# Patient Record
Sex: FEMALE | Race: WHITE | NOT HISPANIC OR LATINO | Employment: FULL TIME | URBAN - METROPOLITAN AREA
[De-identification: names, ages, dates, MRNs, and addresses within clinical notes are randomized per-mention and may not be internally consistent; named-entity substitution may affect disease eponyms.]

---

## 2020-07-26 ENCOUNTER — HOSPITAL ENCOUNTER (EMERGENCY)
Facility: MEDICAL CENTER | Age: 25
End: 2020-07-26
Attending: EMERGENCY MEDICINE
Payer: COMMERCIAL

## 2020-07-26 VITALS
OXYGEN SATURATION: 99 % | TEMPERATURE: 98 F | WEIGHT: 187.83 LBS | HEART RATE: 66 BPM | BODY MASS INDEX: 36.88 KG/M2 | SYSTOLIC BLOOD PRESSURE: 111 MMHG | RESPIRATION RATE: 18 BRPM | HEIGHT: 60 IN | DIASTOLIC BLOOD PRESSURE: 63 MMHG
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
## 2020-07-26 DIAGNOSIS — S16.1XXA STRAIN OF NECK MUSCLE, INITIAL ENCOUNTER: ICD-10-CM

## 2020-07-26 PROCEDURE — 99284 EMERGENCY DEPT VISIT MOD MDM: CPT

## 2020-07-26 PROCEDURE — 700102 HCHG RX REV CODE 250 W/ 637 OVERRIDE(OP): Performed by: EMERGENCY MEDICINE

## 2020-07-26 PROCEDURE — A9270 NON-COVERED ITEM OR SERVICE: HCPCS | Performed by: EMERGENCY MEDICINE

## 2020-07-26 RX ORDER — IBUPROFEN 200 MG
400 TABLET ORAL EVERY 6 HOURS PRN
Status: SHIPPED | COMMUNITY
End: 2022-09-16

## 2020-07-26 RX ORDER — IBUPROFEN 600 MG/1
600 TABLET ORAL ONCE
Status: COMPLETED | OUTPATIENT
Start: 2020-07-26 | End: 2020-07-26

## 2020-07-26 RX ORDER — ACETAMINOPHEN 500 MG
1000 TABLET ORAL EVERY 6 HOURS PRN
Status: SHIPPED | COMMUNITY
End: 2022-09-16

## 2020-07-26 RX ADMIN — IBUPROFEN 600 MG: 600 TABLET ORAL at 09:25

## 2020-07-26 ASSESSMENT — LIFESTYLE VARIABLES
HAVE PEOPLE ANNOYED YOU BY CRITICIZING YOUR DRINKING: NO
HAVE YOU EVER FELT YOU SHOULD CUT DOWN ON YOUR DRINKING: NO
EVER FELT BAD OR GUILTY ABOUT YOUR DRINKING: NO
ON A TYPICAL DAY WHEN YOU DRINK ALCOHOL HOW MANY DRINKS DO YOU HAVE: 0
DO YOU DRINK ALCOHOL: NO
HOW MANY TIMES IN THE PAST YEAR HAVE YOU HAD 5 OR MORE DRINKS IN A DAY: 0
EVER HAD A DRINK FIRST THING IN THE MORNING TO STEADY YOUR NERVES TO GET RID OF A HANGOVER: NO
CONSUMPTION TOTAL: NEGATIVE
TOTAL SCORE: 0
AVERAGE NUMBER OF DAYS PER WEEK YOU HAVE A DRINK CONTAINING ALCOHOL: 0

## 2020-07-26 NOTE — ED NOTES
Pt will be discharged home with instructions to follow up with primary care provider, or return to ED if symptoms worsen, or for any emergent medical process.  She verbalizes understanding of educational materials provided during this visit and agrees to follow our recommendations.  No exacerbations of initial presentations are noted.  Ambulates independently and denies any new needs.  The patient will return for new onset or worsening symptomatology and is stable at the time of discharge. She was given return precautions and verbalizes understanding and will comply with recommendations.
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
## 2020-07-26 NOTE — Clinical Note
Randi Garcia was seen and treated in our emergency department on 7/26/2020.  She may return to work on 07/28/2020.       If you have any questions or concerns, please don't hesitate to call.      Rinku Abreu M.D.

## 2020-09-29 ENCOUNTER — HOSPITAL ENCOUNTER (OUTPATIENT)
Facility: MEDICAL CENTER | Age: 25
End: 2020-09-29
Attending: OBSTETRICS & GYNECOLOGY
Payer: COMMERCIAL

## 2020-09-29 ENCOUNTER — GYNECOLOGY VISIT (OUTPATIENT)
Dept: OBGYN | Facility: CLINIC | Age: 25
End: 2020-09-29
Payer: COMMERCIAL

## 2020-09-29 VITALS — SYSTOLIC BLOOD PRESSURE: 104 MMHG | WEIGHT: 187 LBS | DIASTOLIC BLOOD PRESSURE: 62 MMHG | BODY MASS INDEX: 36.52 KG/M2

## 2020-09-29 DIAGNOSIS — Z01.419 WELL WOMAN EXAM WITH ROUTINE GYNECOLOGICAL EXAM: ICD-10-CM

## 2020-09-29 DIAGNOSIS — Z12.4 CERVICAL CANCER SCREENING: ICD-10-CM

## 2020-09-29 LAB — CYTOLOGY REG CYTOL: NORMAL

## 2020-09-29 PROCEDURE — 88175 CYTOPATH C/V AUTO FLUID REDO: CPT

## 2020-09-29 PROCEDURE — 99385 PREV VISIT NEW AGE 18-39: CPT | Performed by: OBSTETRICS & GYNECOLOGY

## 2020-09-29 NOTE — PROGRESS NOTES
Well Woman Exam    CC: well woman exam        HPI: Randi Garcia is a 25 y.o.  female who presents for her Annual Gynecologic Exam  Pt has no complaints.  Currently using Mirena IUD for contraception, sexually active with 1 male partner.  Reports her Mirena is due for exchange and baby of next year but has noticed some increase bleeding with her periods recently.  Considering kids after her next Mirena closer to age 29.      Health Maintenance:  Pap: reports 3yrs  Gardisil: completed      ROS:   12 system review of symptoms performed and negative, see intake form for details      OB History    Para Term  AB Living   0 0 0 0 0 0   SAB TAB Ectopic Molar Multiple Live Births   0 0 0 0 0 0         GYN Hx:   Menarche 12  Minimal menses w/ mirena  Denies hx of STIs  Denies hx of abnl paps     Past Medical History:   Diagnosis Date   • Anxiety    • Depression        Past Surgical History:   Procedure Laterality Date   • APPENDECTOMY     • OTHER      ankle surgeries       Medications:   Current Outpatient Medications Ordered in Epic   Medication Sig Dispense Refill   • levonorgestrel (MIRENA) 52 mg (20 mcg/24 hr) IUD 1 Intra Uterine Device by Intrauterine route See Admin Instructions. Change every 4 years     • acetaminophen (TYLENOL) 500 MG Tab Take 1,000 mg by mouth every 6 hours as needed for Moderate Pain.     • ibuprofen (MOTRIN) 200 MG Tab Take 400 mg by mouth every 6 hours as needed for Mild Pain.       No current Epic-ordered facility-administered medications on file.        Allergies: Hydrocodone and Penicillins    Social History     Tobacco Use   • Smoking status: Never Smoker   • Smokeless tobacco: Never Used   Substance Use Topics   • Alcohol use: Not Currently   • Drug use: Never         Family History   Problem Relation Age of Onset   • Cancer Mother    • No Known Problems Father    • Asthma Sister    • No Known Problems Brother      Blood clots in mother??  Patient reports her  mom during pregnancy may have had a blood clot in her leg, reports that she does not speak with her mother currently so cannot asked to find out if her mother was on blood thinners related to this or if she had inherited thrombophilia testing      Physical Exam   /62   Wt 84.8 kg (187 lb)   LMP 2020   Breastfeeding No   BMI 36.52 kg/m²   gen: AAO, NAD, affect appropriate  Neck: non-tender, no masses, no thyromegaly/nodules appreciated  CV: RRR; no LE edema  resp: ctab  Breast: symmetric, no skin changes, no masses, nontender, no nipple discharge, no lymphadenopathy  abd: soft, NT, ND, no masses, no organomegaly, no hernias  : NEFG, normal urethral meatus, normal anus/perineum, normal vagina and cervix, +strings.  Uterus small, anteverted, no adnexal masses/tenderness  Skin: warm/dry, no lesions    Breast and pelvic exam chaperoned by MA (KATIE).  Assessment:   25 y.o.  here for well woman exam  1. Cervical cancer screening  THINPREP PAP,REFLEX HPV ON ASC-US ONLY   2. Well woman exam with routine gynecological exam  IUD Insertion       Plan:   Pap:collected, reviewed pap guidelines  Mirena exchange whenever patient is ready and desiring of this    Declines STI screening today    Discussed possible family history of VTE during pregnancy.  Discussed that ideally we would have a more accurate family history and know if her mother had been tested for inherited thrombophilias.  Could consider testing for common inherited thrombophilias for patient at any time, especially prior to pregnancy.      Follow up for Mirena IUD insertion      Soniya May MD  [facility], Women's [facility]

## 2020-09-29 NOTE — NON-PROVIDER
Pt here for new pt appt  Pt states she wants to talk about BC/ Annual   Pharmacy verified  Good #: [phone]  PAP: 3 yrs ago   BC: Mirena Expires in Feb 2021, desires another mirena once this one expires.  Pap Today.

## 2020-10-12 ENCOUNTER — TELEPHONE (OUTPATIENT)
Dept: OBGYN | Facility: CLINIC | Age: 25
End: 2020-10-12

## 2020-10-12 NOTE — TELEPHONE ENCOUNTER
Patient LVM on 10/09 in regards to rescheduling her Mirena Insertion on 10/15 w/ Abdirahman.Returned patient's voicemail asking her to give us a call so we can r/s. Abdirahman's next available provider use only slot is 11/02.

## 2020-10-13 ENCOUNTER — TELEPHONE (OUTPATIENT)
Dept: OBGYN | Facility: CLINIC | Age: 25
End: 2020-10-13

## 2020-12-11 ENCOUNTER — GYNECOLOGY VISIT (OUTPATIENT)
Dept: OBGYN | Facility: CLINIC | Age: 25
End: 2020-12-11
Payer: COMMERCIAL

## 2020-12-11 VITALS — WEIGHT: 187 LBS | BODY MASS INDEX: 36.52 KG/M2 | SYSTOLIC BLOOD PRESSURE: 116 MMHG | DIASTOLIC BLOOD PRESSURE: 59 MMHG

## 2020-12-11 DIAGNOSIS — Z30.433 ENCOUNTER FOR REMOVAL AND REINSERTION OF IUD: ICD-10-CM

## 2020-12-11 LAB
INT CON NEG: NORMAL
INT CON POS: NORMAL
POC URINE PREGNANCY TEST: NEGATIVE

## 2020-12-11 PROCEDURE — 58300 INSERT INTRAUTERINE DEVICE: CPT | Performed by: OBSTETRICS & GYNECOLOGY

## 2020-12-11 PROCEDURE — 58301 REMOVE INTRAUTERINE DEVICE: CPT | Performed by: OBSTETRICS & GYNECOLOGY

## 2020-12-11 PROCEDURE — 81025 URINE PREGNANCY TEST: CPT | Performed by: OBSTETRICS & GYNECOLOGY

## 2020-12-11 NOTE — PROCEDURES
IUD Insertion    Date/Time: 2020 9:55 AM  Performed by: Soniya May M.D.  Authorized by: Soniya May M.D.     IUD Removal    Date/Time: 2020 9:55 AM  Performed by: Soniya May M.D.  Authorized by: Soniya May M.D.         CC: desires IUD exhange    HPI:  25 y.o.  presents today for desired IUD insertion.  Pt today desires Mirena IUD exchange.    No LMP recorded. Patient has had an implant.        /59   Wt 84.8 kg (187 lb)   BMI 36.52 kg/m²    The bimanual exam is performed the uterus is noted to be 6 weeks in size and is anteverted  IUD removal:  Speculum placed in the vagina and cervix visualized. IUD strings seen. IUD strings grasped with ring forceps, removed easily, noted to be intact, and discarded.  IUD Insertion:  the cervix was cleansed with Betadine swabs x3  Tenaculum was placed on the anterior lip of the cervix  The IUD was loaded into   The uterus was sounded to a depth of 6cm  The IUD was released into the uterus.    The strings trimmed to approximately 2 cm  Tenaculum was removed from the cervix and hemostasis was noted.  The patient tolerated the procedure well    Lot: [id number]      A/P: 25 y.o.  s/p mirena IUD exchange as above  - prior to insertion, risks of IUD reviewed with pt including risk of insertion including pain, bleeding, infection, uterine perforation (approximately 1.1000) and possible need for additional surgical procedure for removal, as well as risks of IUD including pregnancy/contraception failure.    All questions answered, consent signed.    - reviewed need for backup contraception for intercourse within 7 days    Plan for f/u as needed for IUD check in 1mos, earlier if concerns.      Soniya May MD  [facility], Women[facility]

## 2020-12-11 NOTE — NON-PROVIDER
Pt here for IUD Mirena  Pt states no complaints  Good#[phone]  Pharmacy verified  UPT-Negative

## 2020-12-11 NOTE — PROGRESS NOTES
GYN:  Patient here for planned IUD removal and insertion.  See procedure note for detail.    Soniya May MD  [facility], Women's Health

## 2021-02-23 ENCOUNTER — NON-PROVIDER VISIT (OUTPATIENT)
Dept: URGENT CARE | Facility: PHYSICIAN GROUP | Age: 26
End: 2021-02-23

## 2021-02-23 DIAGNOSIS — Z02.1 PRE-EMPLOYMENT DRUG SCREENING: ICD-10-CM

## 2021-02-23 LAB
AMP AMPHETAMINE: NORMAL
COC COCAINE: NORMAL
INT CON NEG: NORMAL
INT CON POS: NORMAL
MET METHAMPHETAMINES: NORMAL
OPI OPIATES: NORMAL
PCP PHENCYCLIDINE: NORMAL
POC DRUG COMMENT 753798-OCCUPATIONAL HEALTH: NORMAL
THC: NORMAL

## 2021-02-23 PROCEDURE — 80305 DRUG TEST PRSMV DIR OPT OBS: CPT | Performed by: PHYSICIAN ASSISTANT

## 2021-03-10 ENCOUNTER — GYNECOLOGY VISIT (OUTPATIENT)
Dept: OBGYN | Facility: CLINIC | Age: 26
End: 2021-03-10
Payer: COMMERCIAL

## 2021-03-10 VITALS — BODY MASS INDEX: 36.52 KG/M2 | WEIGHT: 187 LBS | DIASTOLIC BLOOD PRESSURE: 73 MMHG | SYSTOLIC BLOOD PRESSURE: 126 MMHG

## 2021-03-10 DIAGNOSIS — Z30.431 ENCOUNTER FOR ROUTINE CHECKING OF INTRAUTERINE CONTRACEPTIVE DEVICE (IUD): ICD-10-CM

## 2021-03-10 PROCEDURE — 99212 OFFICE O/P EST SF 10 MIN: CPT | Performed by: OBSTETRICS & GYNECOLOGY

## 2021-03-10 NOTE — PROGRESS NOTES
GYN Visit    CC: IUD check    HPI:  25 y.o.  s/p mirena IUD exchange on 20 with me for contraception.  Reports feeling well, denies vaginal bleeding.  No concerns today.  Has had intercourse without difficulty.      ROS:  GYN: denies ongoing vaginal bleeding, pelvic pain, urinary concerns.      Physical exam:  /73   Wt 84.8 kg (187 lb)   Gen: conversant, NAD  Abd: soft, NT, ND, no masses  : NEFG, normal vagina, urthethral meatus normal in appearance, cervix normal with +strings present    A/P: 25 y.o.  with mirena IUD in place  - IUD appears in place        RTC prn or annually for well woman exam    Soniya May MD  [facility], Women's Health

## 2021-03-10 NOTE — NON-PROVIDER
Pt here for IUD Check   Mirena inserted 12/11/2020  Pt states no concerns   Pharmacy verified  Good # [phone]

## 2021-03-24 ENCOUNTER — OFFICE VISIT (OUTPATIENT)
Dept: URGENT CARE | Facility: PHYSICIAN GROUP | Age: 26
End: 2021-03-24
Payer: COMMERCIAL

## 2021-03-24 VITALS
HEART RATE: 89 BPM | BODY MASS INDEX: 35.34 KG/M2 | WEIGHT: 180 LBS | OXYGEN SATURATION: 96 % | DIASTOLIC BLOOD PRESSURE: 82 MMHG | HEIGHT: 60 IN | SYSTOLIC BLOOD PRESSURE: 120 MMHG | TEMPERATURE: 97 F | RESPIRATION RATE: 18 BRPM

## 2021-03-24 DIAGNOSIS — R07.89 CHEST DISCOMFORT: ICD-10-CM

## 2021-03-24 DIAGNOSIS — F32.A DEPRESSION, UNSPECIFIED DEPRESSION TYPE: ICD-10-CM

## 2021-03-24 DIAGNOSIS — F41.9 ANXIETY: ICD-10-CM

## 2021-03-24 PROCEDURE — 99204 OFFICE O/P NEW MOD 45 MIN: CPT | Performed by: NURSE PRACTITIONER

## 2021-03-24 NOTE — PROGRESS NOTES
Subjective:      Randi Garcia is a 25 y.o. female who presents with Shortness of Breath (x2days, chest pain, felt faint x1day)            HPI New. Randi is a 25 year old female here for shortness of breath, chest pain and feeling faint for 2 days. She denies fever, chills, myalgia, nausea or diarrhea. She denies history of asthma. Chest pain is in left chest, sharp. She does report anxiety and becomes tearful during exam. States problems sleeping. Appetite ok and no S/H ideation at this time. She is interested in counseling as well as possible medication therapy. Has supportive fiance here in [place] and rest of family in [place]. Estranged from mother but close to father and grandparents.    Hydrocodone and Penicillins  Current Outpatient Medications on File Prior to Visit   Medication Sig Dispense Refill   • levonorgestrel (MIRENA) 52 mg (20 mcg/24 hr) IUD 1 Intra Uterine Device by Intrauterine route See Admin Instructions. Change every 4 years     • acetaminophen (TYLENOL) 500 MG Tab Take 1,000 mg by mouth every 6 hours as needed for Moderate Pain.     • ibuprofen (MOTRIN) 200 MG Tab Take 400 mg by mouth every 6 hours as needed for Mild Pain.       No current facility-administered medications on file prior to visit.     Social History     Socioeconomic History   • Marital status: Single     Spouse name: Not on file   • Number of children: Not on file   • Years of education: Not on file   • Highest education level: Not on file   Occupational History   • Not on file   Tobacco Use   • Smoking status: Never Smoker   • Smokeless tobacco: Never Used   Substance and Sexual Activity   • Alcohol use: Not Currently   • Drug use: Never   • Sexual activity: Yes     Partners: Male     Birth control/protection: I.U.D.   Other Topics Concern   • Not on file   Social History Narrative   • Not on file     Social Determinants of Health     Financial Resource Strain:    • Difficulty of Paying Living Expenses:    Food  Insecurity:    • Worried About Running Out of Food in the Last Year:    • Ran Out of Food in the Last Year:    Transportation Needs:    • Lack of Transportation (Medical):    • Lack of Transportation (Non-Medical):    Physical Activity:    • Days of Exercise per Week:    • Minutes of Exercise per Session:    Stress:    • Feeling of Stress :    Social Connections:    • Frequency of Communication with Friends and Family:    • Frequency of Social Gatherings with Friends and Family:    • Attends Rastafari Services:    • Active Member of Clubs or Organizations:    • Attends Club or Organization Meetings:    • Marital Status:    Intimate Partner Violence:    • Fear of Current or Ex-Partner:    • Emotionally Abused:    • Physically Abused:    • Sexually Abused:      Breast Cancer-related family history is not on file.      ROS       Objective:     /82   Pulse 89   Temp 36.1 °C (97 °F) (Temporal)   Resp 18   Ht 1.524 m (5')   Wt 81.6 kg (180 lb)   SpO2 96%   BMI 35.15 kg/m²      Physical Exam  Constitutional:       Appearance: Normal appearance.   Cardiovascular:      Rate and Rhythm: Normal rate and regular rhythm.      Heart sounds: No murmur.   Pulmonary:      Effort: Pulmonary effort is normal.      Breath sounds: Normal breath sounds.   Abdominal:      General: Abdomen is flat.      Palpations: Abdomen is soft.      Tenderness: There is no abdominal tenderness.   Musculoskeletal:         General: Normal range of motion.   Skin:     General: Skin is warm and dry.   Neurological:      General: No focal deficit present.      Mental Status: She is alert and oriented to person, place, and time.   Psychiatric:         Attention and Perception: Attention normal.         Mood and Affect: Mood is depressed.         Speech: Speech normal.         Behavior: Behavior normal.         Thought Content: Thought content does not include homicidal or suicidal ideation.         Cognition and Memory: Cognition normal.       Comments: Teary eyed in clinic.                 Assessment/Plan:        1. Anxiety  REFERRAL TO FOLLOW-UP WITH PRIMARY CARE    REFERRAL TO PSYCHOLOGY    sertraline (ZOLOFT) 50 MG Tab   2. Depression, unspecified depression type  TSH    CBC WITH DIFFERENTIAL    Comp Metabolic Panel    REFERRAL TO FOLLOW-UP WITH PRIMARY CARE    REFERRAL TO PSYCHOLOGY    sertraline (ZOLOFT) 50 MG Tab   3. Chest discomfort  EKG - Clinic Performed     EKG with NSR; rate of 66; no ectopy or ST abnormalities  Referrals to PCP and psychology.  Start zoloft at 50 mg qhs. Discussed not stopping this medication abruptly and potential side effects.  Labs.  Urged to reach out with any questions or concerns.

## 2021-05-12 ENCOUNTER — TELEPHONE (OUTPATIENT)
Dept: SCHEDULING | Facility: IMAGING CENTER | Age: 26
End: 2021-05-12

## 2021-05-26 ENCOUNTER — OFFICE VISIT (OUTPATIENT)
Dept: MEDICAL GROUP | Facility: PHYSICIAN GROUP | Age: 26
End: 2021-05-26
Payer: COMMERCIAL

## 2021-05-26 VITALS
RESPIRATION RATE: 16 BRPM | WEIGHT: 193.6 LBS | OXYGEN SATURATION: 92 % | DIASTOLIC BLOOD PRESSURE: 74 MMHG | HEIGHT: 60 IN | SYSTOLIC BLOOD PRESSURE: 128 MMHG | HEART RATE: 79 BPM | BODY MASS INDEX: 38.01 KG/M2 | TEMPERATURE: 98.1 F

## 2021-05-26 DIAGNOSIS — R63.5 WEIGHT GAIN: ICD-10-CM

## 2021-05-26 DIAGNOSIS — F32.A DEPRESSION, UNSPECIFIED DEPRESSION TYPE: ICD-10-CM

## 2021-05-26 DIAGNOSIS — F41.9 ANXIETY: ICD-10-CM

## 2021-05-26 PROBLEM — M25.572 LEFT ANKLE PAIN: Status: ACTIVE | Noted: 2018-04-18

## 2021-05-26 PROBLEM — E66.9 OBESITY WITH BODY MASS INDEX 30 OR GREATER: Status: ACTIVE | Noted: 2018-04-18

## 2021-05-26 PROCEDURE — 99214 OFFICE O/P EST MOD 30 MIN: CPT | Performed by: FAMILY MEDICINE

## 2021-05-26 ASSESSMENT — PATIENT HEALTH QUESTIONNAIRE - PHQ9: CLINICAL INTERPRETATION OF PHQ2 SCORE: 0

## 2021-05-26 NOTE — PROGRESS NOTES
Subjective:     CC:   Chief Complaint   Patient presents with   • Establish Care       HPI:   Randi presents today to establish care. Patient was placed on sertraline about 3 months ago and states she is feeling wonderful. She feels that this certainly impact she hasn't felt this good since she was 12 years of age. Patient was also supposed to get lab work done and patient is willing to go ahead and get this done now. Patient when she was going to you did have MMR titers done when she was in the meeting but they did give her 1 vaccine recommend repeating the titers again. Patient states that she did have chickenpox as a child. Patient is seeing GYN and just had a Mirena placed in January. Patient did get her last Covid shot this month    Past Medical History:   Diagnosis Date   • Anxiety    • Depression        Social History     Tobacco Use   • Smoking status: Never Smoker   • Smokeless tobacco: Never Used   Vaping Use   • Vaping Use: Never used   Substance Use Topics   • Alcohol use: Yes     Comment: Occ   • Drug use: Never       Current Outpatient Medications Ordered in Epic   Medication Sig Dispense Refill   • sertraline (ZOLOFT) 50 MG Tab To take 1 p.o. each day 90 tablet 1   • levonorgestrel (MIRENA) 52 mg (20 mcg/24 hr) IUD 1 Intra Uterine Device by Intrauterine route See Admin Instructions. Change every 4 years     • acetaminophen (TYLENOL) 500 MG Tab Take 1,000 mg by mouth every 6 hours as needed for Moderate Pain.     • ibuprofen (MOTRIN) 200 MG Tab Take 400 mg by mouth every 6 hours as needed for Mild Pain.       No current Epic-ordered facility-administered medications on file.       Allergies:  Hydrocodone and Penicillins    Health Maintenance: Completed    ROS:  Gen: no fevers/chills, no changes in weight  Eyes: no changes in vision  ENT: no sore throat, no hearing loss, no bloody nose  Pulm: no sob, no cough, no snoring or stop breathing at night  CV: no chest pain, no palpitations  GI: no  nausea/vomiting, no diarrhea, no black or bloody stools  : no dysuria  MSk: no myalgias  Skin: no rash  Neuro: no headaches, no numbness/tingling  Heme/Lymph: no easy bruising    Objective:     Exam:  /74   Pulse 79   Temp 36.7 °C (98.1 °F)   Resp 16   Ht 1.524 m (5')   Wt 87.8 kg (193 lb 9.6 oz)   SpO2 92%   BMI 37.81 kg/m²  Body mass index is 37.81 kg/m².    Gen: Alert and oriented, No apparent distress.  Skin: Warm and dry.  No obvious lesions.  Eyes: Sclera wnl Pupils normal in size  Lungs: Normal effort, CTA bilaterally, no wheezes, rhonchi, or rales  CV: Regular rate and rhythm. No murmurs, rubs, or gallops.  ABD: Soft non-tender no organomegaly  Musculoskeletal: Normal gait. No extremity cyanosis, clubbing, or edema.  Neuro: Oriented to person, place and time  Psych: Mood is wnl       Labs: Fasting labs were ordered patient given a listing of autoimmune labs    Assessment & Plan:     26 y.o. female with the following -     1. Weight gain  We'll need to get lab work done on patient this is a chronic problem. Since patient was feeling well she is fine and she is not overeating we'll need to have her follow-up in about 2 months see how she is doing on her weight    2. Anxiety  Patient is doing well on the present medication we'll continue this. This is a chronic problem  - sertraline (ZOLOFT) 50 MG Tab; To take 1 p.o. each day  Dispense: 90 tablet; Refill: 1    3. Depression, unspecified depression type  Patient is feeling great we'll continue present medications this is a chronic  - sertraline (ZOLOFT) 50 MG Tab; To take 1 p.o. each day  Dispense: 90 tablet; Refill: 1  4. Wellness  We'll get wellness labs on patient.    No follow-ups on file.    Please note that this dictation was created using voice recognition software. I have made every reasonable attempt to correct obvious errors, but I expect that there are errors of grammar and possibly content that I did not discover before finalizing the  note.

## 2021-11-17 ENCOUNTER — HOSPITAL ENCOUNTER (OUTPATIENT)
Dept: LAB | Facility: MEDICAL CENTER | Age: 26
End: 2021-11-17
Attending: FAMILY MEDICINE
Payer: COMMERCIAL

## 2021-11-17 ENCOUNTER — OFFICE VISIT (OUTPATIENT)
Dept: MEDICAL GROUP | Facility: PHYSICIAN GROUP | Age: 26
End: 2021-11-17
Payer: COMMERCIAL

## 2021-11-17 VITALS
BODY MASS INDEX: 39.7 KG/M2 | SYSTOLIC BLOOD PRESSURE: 124 MMHG | RESPIRATION RATE: 16 BRPM | TEMPERATURE: 98.1 F | HEIGHT: 60 IN | WEIGHT: 202.2 LBS | OXYGEN SATURATION: 95 % | DIASTOLIC BLOOD PRESSURE: 76 MMHG | HEART RATE: 76 BPM

## 2021-11-17 DIAGNOSIS — F32.A DEPRESSION, UNSPECIFIED DEPRESSION TYPE: ICD-10-CM

## 2021-11-17 DIAGNOSIS — R63.5 WEIGHT GAIN: ICD-10-CM

## 2021-11-17 DIAGNOSIS — R11.2 NAUSEA AND VOMITING, INTRACTABILITY OF VOMITING NOT SPECIFIED, UNSPECIFIED VOMITING TYPE: ICD-10-CM

## 2021-11-17 DIAGNOSIS — F41.9 ANXIETY: ICD-10-CM

## 2021-11-17 LAB
ALBUMIN SERPL BCP-MCNC: 4.8 G/DL (ref 3.2–4.9)
ALBUMIN/GLOB SERPL: 1.8 G/DL
ALP SERPL-CCNC: 71 U/L (ref 30–99)
ALT SERPL-CCNC: 27 U/L (ref 2–50)
ANION GAP SERPL CALC-SCNC: 14 MMOL/L (ref 7–16)
AST SERPL-CCNC: 15 U/L (ref 12–45)
BASOPHILS # BLD AUTO: 0.6 % (ref 0–1.8)
BASOPHILS # BLD: 0.05 K/UL (ref 0–0.12)
BILIRUB SERPL-MCNC: 0.6 MG/DL (ref 0.1–1.5)
BUN SERPL-MCNC: 13 MG/DL (ref 8–22)
CALCIUM SERPL-MCNC: 9.3 MG/DL (ref 8.5–10.5)
CHLORIDE SERPL-SCNC: 103 MMOL/L (ref 96–112)
CHOLEST SERPL-MCNC: 164 MG/DL (ref 100–199)
CO2 SERPL-SCNC: 21 MMOL/L (ref 20–33)
CREAT SERPL-MCNC: 0.77 MG/DL (ref 0.5–1.4)
EOSINOPHIL # BLD AUTO: 0.06 K/UL (ref 0–0.51)
EOSINOPHIL NFR BLD: 0.7 % (ref 0–6.9)
ERYTHROCYTE [DISTWIDTH] IN BLOOD BY AUTOMATED COUNT: 43.3 FL (ref 35.9–50)
FASTING STATUS PATIENT QL REPORTED: NORMAL
GLOBULIN SER CALC-MCNC: 2.7 G/DL (ref 1.9–3.5)
GLUCOSE SERPL-MCNC: 76 MG/DL (ref 65–99)
HCT VFR BLD AUTO: 47 % (ref 37–47)
HDLC SERPL-MCNC: 49 MG/DL
HGB BLD-MCNC: 15.7 G/DL (ref 12–16)
IMM GRANULOCYTES # BLD AUTO: 0.02 K/UL (ref 0–0.11)
IMM GRANULOCYTES NFR BLD AUTO: 0.2 % (ref 0–0.9)
LDLC SERPL CALC-MCNC: 102 MG/DL
LYMPHOCYTES # BLD AUTO: 3.38 K/UL (ref 1–4.8)
LYMPHOCYTES NFR BLD: 37.6 % (ref 22–41)
MCH RBC QN AUTO: 31.6 PG (ref 27–33)
MCHC RBC AUTO-ENTMCNC: 33.4 G/DL (ref 33.6–35)
MCV RBC AUTO: 94.6 FL (ref 81.4–97.8)
MONOCYTES # BLD AUTO: 0.6 K/UL (ref 0–0.85)
MONOCYTES NFR BLD AUTO: 6.7 % (ref 0–13.4)
NEUTROPHILS # BLD AUTO: 4.88 K/UL (ref 2–7.15)
NEUTROPHILS NFR BLD: 54.2 % (ref 44–72)
NRBC # BLD AUTO: 0 K/UL
NRBC BLD-RTO: 0 /100 WBC
PLATELET # BLD AUTO: 285 K/UL (ref 164–446)
PMV BLD AUTO: 11.4 FL (ref 9–12.9)
POTASSIUM SERPL-SCNC: 4 MMOL/L (ref 3.6–5.5)
PROT SERPL-MCNC: 7.5 G/DL (ref 6–8.2)
RBC # BLD AUTO: 4.97 M/UL (ref 4.2–5.4)
RUBV AB SER QL: 157 IU/ML
SODIUM SERPL-SCNC: 138 MMOL/L (ref 135–145)
T4 FREE SERPL-MCNC: 1.61 NG/DL (ref 0.93–1.7)
TRIGL SERPL-MCNC: 63 MG/DL (ref 0–149)
TSH SERPL DL<=0.005 MIU/L-ACNC: 0.53 UIU/ML (ref 0.38–5.33)
WBC # BLD AUTO: 9 K/UL (ref 4.8–10.8)

## 2021-11-17 PROCEDURE — 80053 COMPREHEN METABOLIC PANEL: CPT

## 2021-11-17 PROCEDURE — 84439 ASSAY OF FREE THYROXINE: CPT

## 2021-11-17 PROCEDURE — 84443 ASSAY THYROID STIM HORMONE: CPT

## 2021-11-17 PROCEDURE — 86735 MUMPS ANTIBODY: CPT

## 2021-11-17 PROCEDURE — 99213 OFFICE O/P EST LOW 20 MIN: CPT | Performed by: FAMILY MEDICINE

## 2021-11-17 PROCEDURE — 36415 COLL VENOUS BLD VENIPUNCTURE: CPT

## 2021-11-17 PROCEDURE — 80061 LIPID PANEL: CPT

## 2021-11-17 PROCEDURE — 85025 COMPLETE CBC W/AUTO DIFF WBC: CPT

## 2021-11-17 PROCEDURE — 86762 RUBELLA ANTIBODY: CPT

## 2021-11-17 PROCEDURE — 86765 RUBEOLA ANTIBODY: CPT

## 2021-11-17 RX ORDER — OMEPRAZOLE 40 MG/1
40 CAPSULE, DELAYED RELEASE ORAL DAILY
Qty: 30 CAPSULE | Refills: 1 | Status: SHIPPED
Start: 2021-11-17 | End: 2021-11-24

## 2021-11-17 NOTE — PROGRESS NOTES
Subjective:     CC:   Chief Complaint   Patient presents with   • Follow-Up       HPI:   Randi presents today with a month-long history of occasionally throwing up.  Patient usually states that she has mucus or yellowish material.  Patient denies any problems with her bowel habits.  Patient states that she is able to eat.  This vomiting occurs in various times a day.  Patient states when she was a lot younger she had problems with reflux.  Patient does have IUD in but we went ahead and did a pregnancy test which is negative.    Past Medical History:   Diagnosis Date   • Anxiety    • Depression    • Nausea & vomiting 11/17/2021       Social History     Tobacco Use   • Smoking status: Never Smoker   • Smokeless tobacco: Never Used   Vaping Use   • Vaping Use: Never used   Substance Use Topics   • Alcohol use: Yes     Comment: Occ   • Drug use: Never       Current Outpatient Medications Ordered in Epic   Medication Sig Dispense Refill   • omeprazole (PRILOSEC) 40 MG delayed-release capsule Take 1 Capsule by mouth every day for 30 days. 30 Capsule 1   • sertraline (ZOLOFT) 50 MG Tab To take 1 p.o. each day 90 tablet 1   • levonorgestrel (MIRENA) 52 mg (20 mcg/24 hr) IUD 1 Intra Uterine Device by Intrauterine route See Admin Instructions. Change every 4 years     • acetaminophen (TYLENOL) 500 MG Tab Take 1,000 mg by mouth every 6 hours as needed for Moderate Pain.     • ibuprofen (MOTRIN) 200 MG Tab Take 400 mg by mouth every 6 hours as needed for Mild Pain.       No current Epic-ordered facility-administered medications on file.       Allergies:  Hydrocodone and Penicillins    Health Maintenance: Completed    ROS:  Gen: no fevers/chills, patient has gained some weight  Eyes: no changes in vision  ENT: no sore throat, no hearing loss, no bloody nose  Pulm: no sob, no cough  CV: no chest pain, no palpitations  GI: no nausea/vomiting, no diarrhea  Skin: no rash  Neuro: no headaches, no numbness/tingling  Heme/Lymph: no  easy bruising    Objective:     Exam:  /76   Pulse 76   Temp 36.7 °C (98.1 °F)   Resp 16   Ht 1.524 m (5')   Wt 91.7 kg (202 lb 3.2 oz)   SpO2 95%   BMI 39.49 kg/m²  Body mass index is 39.49 kg/m².    Gen: Alert and oriented, No apparent distress.  Skin: Warm and dry.  No obvious lesions.  Eyes: Sclera wnl Pupils normal in size  Lungs: Normal effort, CTA bilaterally, no wheezes, rhonchi, or rales  CV: Regular rate and rhythm. No murmurs, rubs, or gallops.  ABD: Soft very minimal discomfort on  palpation of the epigastric area  Musculoskeletal: Normal gait. No extremity cyanosis, clubbing, or edema.  Neuro: Oriented to person, place and time  Psych: Mood is wnl       Assessment & Plan:     26 y.o. female with the following -     1. Nausea and vomiting, intractability of vomiting not specified, unspecified vomiting type  I did remind patient she needs her lab work done she will go ahead and get that done today.  I will also put her on Prilosec assuming this may be heartburn since she has had it long ago.  Patient has been on sertraline in the past and has been able to tolerate that so we will continue it for now since it is working for her.  This is a acute problem    Other orders  - omeprazole (PRILOSEC) 40 MG delayed-release capsule; Take 1 Capsule by mouth every day for 30 days.  Dispense: 30 Capsule; Refill: 1       Return in about 1 week (around 11/24/2021).    Please note that this dictation was created using voice recognition software. I have made every reasonable attempt to correct obvious errors, but I expect that there are errors of grammar and possibly content that I did not discover before finalizing the note.

## 2021-11-18 LAB
MEV IGG SER IA-ACNC: 0.5
MUV IGG SER IA-ACNC: 2.19

## 2021-11-24 ENCOUNTER — OFFICE VISIT (OUTPATIENT)
Dept: MEDICAL GROUP | Facility: PHYSICIAN GROUP | Age: 26
End: 2021-11-24
Payer: COMMERCIAL

## 2021-11-24 VITALS
OXYGEN SATURATION: 99 % | TEMPERATURE: 98.6 F | DIASTOLIC BLOOD PRESSURE: 80 MMHG | WEIGHT: 200.4 LBS | BODY MASS INDEX: 39.34 KG/M2 | HEIGHT: 60 IN | SYSTOLIC BLOOD PRESSURE: 122 MMHG | RESPIRATION RATE: 16 BRPM | HEART RATE: 68 BPM

## 2021-11-24 DIAGNOSIS — G89.29 CHRONIC PAIN OF BOTH ANKLES: ICD-10-CM

## 2021-11-24 DIAGNOSIS — M25.572 CHRONIC PAIN OF BOTH ANKLES: ICD-10-CM

## 2021-11-24 DIAGNOSIS — S90.219S: ICD-10-CM

## 2021-11-24 DIAGNOSIS — M25.571 CHRONIC PAIN OF BOTH ANKLES: ICD-10-CM

## 2021-11-24 DIAGNOSIS — Z23 NEED FOR VACCINATION: ICD-10-CM

## 2021-11-24 PROBLEM — R11.2 NAUSEA & VOMITING: Status: RESOLVED | Noted: 2021-11-17 | Resolved: 2021-11-24

## 2021-11-24 PROCEDURE — 90472 IMMUNIZATION ADMIN EACH ADD: CPT | Performed by: FAMILY MEDICINE

## 2021-11-24 PROCEDURE — 99214 OFFICE O/P EST MOD 30 MIN: CPT | Mod: 25 | Performed by: FAMILY MEDICINE

## 2021-11-24 PROCEDURE — 90686 IIV4 VACC NO PRSV 0.5 ML IM: CPT | Performed by: FAMILY MEDICINE

## 2021-11-24 PROCEDURE — 90707 MMR VACCINE SC: CPT | Performed by: FAMILY MEDICINE

## 2021-11-24 PROCEDURE — 90471 IMMUNIZATION ADMIN: CPT | Performed by: FAMILY MEDICINE

## 2021-11-24 ASSESSMENT — FIBROSIS 4 INDEX: FIB4 SCORE: 0.27

## 2021-11-25 NOTE — PROGRESS NOTES
Subjective:     CC:   Chief Complaint   Patient presents with   • Follow-Up       HPI:   Randi presents today for follow-up of her labs.  Patient also has been having bilateral ankle issues she did have surgery done over at [facility] and that was more than 2 years ago but now she is having more discomfort on the lateral sides of her ankle.  Patient also has had a issue with her right great toe the medial side of that toenail always falls off which she states causes a problem.  Patient was seen me last time because she was having some nausea patient states the nausea completely went away she never picked up the prescription for omeprazole.  Patient denies any issues with reflux or acid indigestion.  Patient will be getting a different insurance will probably need to wait on any referrals patient will notify me when her new insurance kicks in    Past Medical History:   Diagnosis Date   • Anxiety    • Depression    • Nausea & vomiting 11/17/2021       Social History     Tobacco Use   • Smoking status: Never Smoker   • Smokeless tobacco: Never Used   Vaping Use   • Vaping Use: Never used   Substance Use Topics   • Alcohol use: Yes     Comment: Occ   • Drug use: Never       Current Outpatient Medications Ordered in Epic   Medication Sig Dispense Refill   • sertraline (ZOLOFT) 50 MG Tab To take 1 p.o. each day 90 tablet 1   • levonorgestrel (MIRENA) 52 mg (20 mcg/24 hr) IUD 1 Intra Uterine Device by Intrauterine route See Admin Instructions. Change every 4 years     • acetaminophen (TYLENOL) 500 MG Tab Take 1,000 mg by mouth every 6 hours as needed for Moderate Pain.     • ibuprofen (MOTRIN) 200 MG Tab Take 400 mg by mouth every 6 hours as needed for Mild Pain.       No current Epic-ordered facility-administered medications on file.       Allergies:  Hydrocodone and Penicillins    Health Maintenance: Completed    ROS:  Gen: no fevers/chills, patient has lost 2 pounds since have last seen her  Eyes: no  changes in vision  ENT: no sore throat, no hearing loss, no bloody nose  Pulm: no sob, no cough  CV: no chest pain, no palpitations  GI: no nausea/vomiting, no diarrhea  : no dysuria  MSk: no myalgias  Skin: no rash  Neuro: no headaches, no numbness/tingling  Heme/Lymph: no easy bruising    Objective:     Exam:  /80   Pulse 68   Temp 37 °C (98.6 °F)   Resp 16   Ht 1.524 m (5')   Wt 90.9 kg (200 lb 6.4 oz)   SpO2 99%   BMI 39.14 kg/m²  Body mass index is 39.14 kg/m².    Gen: Alert and oriented, No apparent distress.  Skin: Warm and dry.  No obvious lesions.  On patient's right great toe she has some whiteness to the medial side of her nail plus part of the nail above it is gone  Eyes: Sclera wnl Pupils normal in size  Lungs: Normal effort, CTA bilaterally, no wheezes, rhonchi, or rales  CV: Regular rate and rhythm. No murmurs, rubs, or gallops.  ABD: Soft non-tender no organomegaly  Musculoskeletal: Normal gait. No extremity cyanosis, clubbing, or edema.  Neuro: Oriented to person, place and time  Psych: Mood is wnl       Assessment & Plan:     26 y.o. female with the following -     1. Chronic pain of both ankles  Patient to let me know when her new insurance gets kicks in  referral can be written to [facility].  I did let patient know she can always go to [facility] also this is a chronic problem  2. Contusion of unspecified great toe with damage to nail, sequela  Would recommend referral to podiatry patient will let me know when her new insurance kicks in so referral can be done this is a chronic problem  3. Need for vaccination  Patient's immunity to mumps and rubella is present but rubeola is equivocal.  I would recommend immunizing her patient needs a repeat MMR in 1 to 2 months  - INFLUENZA VACCINE QUAD INJ (PF)  - MMR SQ       Return in about 2 months (around 1/24/2022), or if symptoms worsen or fail to improve.    Please note that this dictation was created  using voice recognition software. I have made every reasonable attempt to correct obvious errors, but I expect that there are errors of grammar and possibly content that I did not discover before finalizing the note.

## 2021-12-13 DIAGNOSIS — F41.9 ANXIETY: ICD-10-CM

## 2021-12-13 DIAGNOSIS — F32.A DEPRESSION, UNSPECIFIED DEPRESSION TYPE: ICD-10-CM
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
## 2022-08-08 ENCOUNTER — OFFICE VISIT (OUTPATIENT)
Dept: URGENT CARE | Facility: CLINIC | Age: 27
End: 2022-08-08
Payer: COMMERCIAL

## 2022-08-08 VITALS
DIASTOLIC BLOOD PRESSURE: 70 MMHG | SYSTOLIC BLOOD PRESSURE: 108 MMHG | WEIGHT: 200 LBS | TEMPERATURE: 97.2 F | HEART RATE: 65 BPM | BODY MASS INDEX: 39.27 KG/M2 | OXYGEN SATURATION: 99 % | HEIGHT: 60 IN | RESPIRATION RATE: 20 BRPM

## 2022-08-08 DIAGNOSIS — Z20.822 EXPOSURE TO CONFIRMED CASE OF COVID-19: ICD-10-CM

## 2022-08-08 DIAGNOSIS — U07.1 COVID: ICD-10-CM

## 2022-08-08 DIAGNOSIS — R05.9 COUGH: ICD-10-CM

## 2022-08-08 LAB
EXTERNAL QUALITY CONTROL: ABNORMAL
SARS-COV+SARS-COV-2 AG RESP QL IA.RAPID: POSITIVE

## 2022-08-08 PROCEDURE — 87426 SARSCOV CORONAVIRUS AG IA: CPT | Performed by: NURSE PRACTITIONER

## 2022-08-08 PROCEDURE — 99213 OFFICE O/P EST LOW 20 MIN: CPT | Mod: CS,25 | Performed by: NURSE PRACTITIONER

## 2022-08-08 RX ORDER — DEXTROMETHORPHAN HYDROBROMIDE AND PROMETHAZINE HYDROCHLORIDE 15; 6.25 MG/5ML; MG/5ML
5 SYRUP ORAL EVERY 4 HOURS PRN
Qty: 120 ML | Refills: 0 | Status: SHIPPED
Start: 2022-08-08 | End: 2022-09-16

## 2022-08-08 RX ORDER — ALBUTEROL SULFATE 90 UG/1
2 AEROSOL, METERED RESPIRATORY (INHALATION) EVERY 6 HOURS PRN
Qty: 8.5 G | Refills: 0 | Status: SHIPPED
Start: 2022-08-08 | End: 2022-09-16

## 2022-08-08 ASSESSMENT — ENCOUNTER SYMPTOMS
HEADACHES: 1
RHINORRHEA: 1
MYALGIAS: 0
SORE THROAT: 0
NAUSEA: 0
COUGH: 1
VOMITING: 0
DIZZINESS: 0
SHORTNESS OF BREATH: 1
EYE REDNESS: 0
FEVER: 0
CHILLS: 1

## 2022-08-08 ASSESSMENT — FIBROSIS 4 INDEX: FIB4 SCORE: 0.28

## 2022-08-08 NOTE — PROGRESS NOTES
Subjective:   Randi Farmer is a 27 y.o. female who presents for Coronavirus Screening (Spouse covid positive), Shortness of Breath, and Cough      URI   This is a new problem. The current episode started in the past 7 days (hsuband positive covid , multiple negative at home tests). The problem has been gradually worsening. There has been no fever. Associated symptoms include congestion, coughing, headaches and rhinorrhea. Pertinent negatives include no chest pain, dysuria, ear pain, nausea, plugged ear sensation, rash, sore throat or vomiting. She has tried acetaminophen and sleep for the symptoms. The treatment provided no relief.       Review of Systems   Constitutional: Positive for chills and malaise/fatigue. Negative for fever.   HENT: Positive for congestion and rhinorrhea. Negative for ear pain and sore throat.    Eyes: Negative for redness.   Respiratory: Positive for cough and shortness of breath.    Cardiovascular: Negative for chest pain.   Gastrointestinal: Negative for nausea and vomiting.   Genitourinary: Negative for dysuria.   Musculoskeletal: Negative for myalgias.   Skin: Negative for rash.   Neurological: Positive for headaches. Negative for dizziness.       Medications:    • acetaminophen Tabs  • albuterol Aers  • ibuprofen Tabs  • levonorgestrel  • promethazine-dextromethorphan  • sertraline Tabs    Allergies: Hydrocodone and Penicillins    Problem List: Randi Farmer does not have any pertinent problems on file.    Surgical History:  Past Surgical History:   Procedure Laterality Date   • APPENDECTOMY     • OTHER      ankle surgeries       Past Social Hx: Randi Farmer  reports that she has never smoked. She has never used smokeless tobacco. She reports current alcohol use. She reports that she does not use drugs.     Past Family Hx:  Randi Farmer family history includes Alcohol abuse in her father; Asthma in her sister; Cancer in her mother; No  Known Problems in her brother.     Problem list, medications, and allergies reviewed by myself today in Epic.     Objective:     /70 (BP Location: Left arm, Patient Position: Sitting, BP Cuff Size: Adult)   Pulse 65   Temp 36.2 °C (97.2 °F) (Temporal)   Resp 20   Ht 1.524 m (5')   Wt 90.7 kg (200 lb)   SpO2 99%   BMI 39.06 kg/m²     Physical Exam  Vitals and nursing note reviewed.   Constitutional:       General: She is not in acute distress.     Appearance: She is well-developed.   HENT:      Head: Normocephalic and atraumatic.      Right Ear: Tympanic membrane and external ear normal.      Left Ear: Tympanic membrane and external ear normal.      Nose: Congestion present.      Right Sinus: No maxillary sinus tenderness or frontal sinus tenderness.      Left Sinus: No maxillary sinus tenderness or frontal sinus tenderness.      Mouth/Throat:      Mouth: Mucous membranes are moist.      Pharynx: Uvula midline. No posterior oropharyngeal erythema.      Tonsils: No tonsillar exudate or tonsillar abscesses.   Eyes:      General:         Right eye: No discharge.         Left eye: No discharge.      Conjunctiva/sclera: Conjunctivae normal.   Cardiovascular:      Rate and Rhythm: Normal rate.   Pulmonary:      Effort: Pulmonary effort is normal. No respiratory distress.      Breath sounds: Normal breath sounds.   Abdominal:      General: There is no distension.   Musculoskeletal:         General: Normal range of motion.   Skin:     General: Skin is warm and dry.   Neurological:      General: No focal deficit present.      Mental Status: She is alert and oriented to person, place, and time. Mental status is at baseline.      Gait: Gait (gait at baseline) normal.   Psychiatric:         Judgment: Judgment normal.         Assessment/Plan:     Diagnosis and associated orders:     1. Cough  promethazine-dextromethorphan (PROMETHAZINE-DM) 6.25-15 MG/5ML syrup   2. Exposure to confirmed case of COVID-19     3. COVID   albuterol 108 (90 Base) MCG/ACT Aero Soln inhalation aerosol      Comments/MDM:     • Positive covid    Plenty of oral hydration and rest   Over the counter cough suppressant as directed.  Tylenol or ibuprofen for pain and fever as directed.   Warm salt water gargles for sore throat, soft foods, cool liquids.   Saline nasal spray and Flonase  Infection control measures at home. Stay away from people, Hand washing, covering sneeze/cough, disinfect surfaces.   Remain home from work, school, and other populated environments. Work note provided with information of quarantine measures per CDC guidelines.   Overall, the patient is well-appearing. They are not hypoxic, afebrile, and a normal pulmonary exam.      •              Please note that this dictation was created using voice recognition software. I have made a reasonable attempt to correct obvious errors, but I expect that there are errors of grammar and possibly content that I did not discover before finalizing the note.    This note was electronically signed by Rupert VALERIO.

## 2022-08-08 NOTE — LETTER
August 8, 2022         Patient: Randi Farmer   YOB: 1995   Date of Visit: 8/8/2022           To Whom it May Concern:    Randi Farmer was seen in my clinic on 8/8/2022. She may return to work on 8/11/22.    If you have any questions or concerns, please don't hesitate to call.        Sincerely,           GUY Song.  Electronically Signed

## 2022-09-16 ENCOUNTER — OFFICE VISIT (OUTPATIENT)
Dept: MEDICAL GROUP | Facility: PHYSICIAN GROUP | Age: 27
End: 2022-09-16
Payer: COMMERCIAL

## 2022-09-16 VITALS
OXYGEN SATURATION: 94 % | TEMPERATURE: 98.7 F | RESPIRATION RATE: 16 BRPM | WEIGHT: 205.6 LBS | DIASTOLIC BLOOD PRESSURE: 64 MMHG | HEART RATE: 66 BPM | SYSTOLIC BLOOD PRESSURE: 102 MMHG | BODY MASS INDEX: 40.37 KG/M2 | HEIGHT: 60 IN

## 2022-09-16 DIAGNOSIS — S90.219S: ICD-10-CM

## 2022-09-16 DIAGNOSIS — F41.9 ANXIETY: ICD-10-CM

## 2022-09-16 DIAGNOSIS — L68.9 EXCESS BODY AND FACIAL HAIR: ICD-10-CM

## 2022-09-16 DIAGNOSIS — F32.A DEPRESSION, UNSPECIFIED DEPRESSION TYPE: ICD-10-CM

## 2022-09-16 PROBLEM — L60.9 NAIL ABNORMALITY: Status: ACTIVE | Noted: 2022-09-16

## 2022-09-16 PROBLEM — L60.9 NAIL ABNORMALITY: Status: RESOLVED | Noted: 2022-09-16 | Resolved: 2022-09-16

## 2022-09-16 PROCEDURE — 99214 OFFICE O/P EST MOD 30 MIN: CPT | Performed by: FAMILY MEDICINE

## 2022-09-16 ASSESSMENT — PATIENT HEALTH QUESTIONNAIRE - PHQ9: CLINICAL INTERPRETATION OF PHQ2 SCORE: 0

## 2022-09-16 ASSESSMENT — FIBROSIS 4 INDEX: FIB4 SCORE: 0.28

## 2022-09-16 NOTE — PROGRESS NOTES
Subjective:     CC:   Chief Complaint   Patient presents with    Follow-Up       HPI:   Randi presents today for follow-up.  Patient states that she feels that maybe the sertraline may need to be slightly increased at times she feels anxious.  Patient is also wondering she is getting a lot of black hairs in her chest she does not know if one of the medications is causing a little bit more hair growth.  Patient is supposed to see her gynecologist in January I would recommend she mention that to the since she is on Mirena right now.  We can always refer her to dermatology also.  Patient does have a deformed right great toenail and is ready to see podiatry we will go ahead and write a referral.    Past Medical History:   Diagnosis Date    Anxiety     Depression     Nausea & vomiting 11/17/2021       Social History     Tobacco Use    Smoking status: Never    Smokeless tobacco: Never   Vaping Use    Vaping Use: Never used   Substance Use Topics    Alcohol use: Yes     Comment: Occ    Drug use: Never       Current Outpatient Medications Ordered in Epic   Medication Sig Dispense Refill    sertraline (ZOLOFT) 50 MG Tab Take 1-1/2 tablet each day 145 Tablet 0    levonorgestrel (MIRENA) 52 mg (20 mcg/24 hr) IUD 1 Intra Uterine Device by Intrauterine route See Admin Instructions. Change every 4 years       No current Epic-ordered facility-administered medications on file.       Allergies:  Hydrocodone and Penicillins    Health Maintenance: Completed    ROS:  Gen: no fevers/chills, patient has gained 5 pounds in 10 months  Eyes: no changes in vision  ENT: no sore throat, no hearing loss, no bloody nose  Pulm: no sob, no cough  CV: no chest pain, no palpitations  GI: no nausea/vomiting, no diarrhea  Neuro: no headaches, no numbness/tingling  Heme/Lymph: no easy bruising    Objective:     Exam:  /64 (BP Location: Left arm, Patient Position: Sitting, BP Cuff Size: Adult)   Pulse 66   Temp 37.1 °C (98.7 °F) (Temporal)    Resp 16   Ht 1.524 m (5')   Wt 93.3 kg (205 lb 9.6 oz)   SpO2 94%   BMI 40.15 kg/m²  Body mass index is 40.15 kg/m².    Gen: Alert and oriented, No apparent distress.  Skin: Warm and dry.  Patient is noted to have a deformed right great toenail  Eyes: Sclera wnl Pupils normal in size  Lungs: Normal effort, CTA bilaterally, no wheezes, rhonchi, or rales  CV: Regular rate and rhythm. No murmurs, rubs, or gallops.  Musculoskeletal: Normal gait. No extremity cyanosis, clubbing, or edema.  Neuro: Oriented to person, place and time  Psych: Mood is wnl       Assessment & Plan:     27 y.o. female with the following -     1. Excess body and facial hair  Patient to discuss this issue with gynecology but also will write for Derm referral.  Inform patient may take a little while until she is seen during the dermatologist this is acute problem    2. Contusion of unspecified great toe with damage to nail, sequela  Will write a podiatry referral this is a chronic problem    3. Anxiety  I recommend we increase the sertraline to a tablet and a half patient to see me back in about 2 to 3 months for follow-up or sooner if issues worsen this is a chronic problem  - sertraline (ZOLOFT) 50 MG Tab; Take 1-1/2 tablet each day  Dispense: 145 Tablet; Refill: 0    4. Depression, unspecified depression type  - sertraline (ZOLOFT) 50 MG Tab; Take 1-1/2 tablet each day  Dispense: 145 Tablet; Refill: 0       Return in about 3 months (around 12/16/2022).  Question whether she has had hepatitis B patient states when she went to [facility] ([place] they did get some shot records and gave her some immunizations with ask her to bring as much information as possible May need to do immune titers for hep B if we still are wondering if she has completed the series.    Please note that this dictation was created using voice recognition software. I have made every reasonable attempt to correct obvious errors, but I expect that there are errors of grammar and possibly  content that I did not discover before finalizing the note.

## 2023-02-10 DIAGNOSIS — F41.9 ANXIETY: ICD-10-CM

## 2023-02-10 DIAGNOSIS — F32.A DEPRESSION, UNSPECIFIED DEPRESSION TYPE: ICD-10-CM

## 2023-09-22 ENCOUNTER — OFFICE VISIT (OUTPATIENT)
Dept: MEDICAL GROUP | Facility: PHYSICIAN GROUP | Age: 28
End: 2023-09-22
Payer: COMMERCIAL

## 2023-09-22 ENCOUNTER — HOSPITAL ENCOUNTER (OUTPATIENT)
Dept: LAB | Facility: MEDICAL CENTER | Age: 28
End: 2023-09-22
Attending: FAMILY MEDICINE
Payer: COMMERCIAL

## 2023-09-22 VITALS
BODY MASS INDEX: 41.66 KG/M2 | DIASTOLIC BLOOD PRESSURE: 64 MMHG | TEMPERATURE: 98.3 F | SYSTOLIC BLOOD PRESSURE: 110 MMHG | HEART RATE: 77 BPM | RESPIRATION RATE: 16 BRPM | OXYGEN SATURATION: 98 % | HEIGHT: 60 IN | WEIGHT: 212.2 LBS

## 2023-09-22 DIAGNOSIS — R63.5 WEIGHT GAIN: ICD-10-CM

## 2023-09-22 DIAGNOSIS — E55.9 VITAMIN D DEFICIENCY: ICD-10-CM

## 2023-09-22 DIAGNOSIS — Z23 NEED FOR VACCINATION: ICD-10-CM

## 2023-09-22 DIAGNOSIS — Z00.00 WELLNESS EXAMINATION: ICD-10-CM

## 2023-09-22 DIAGNOSIS — M25.562 ACUTE PAIN OF LEFT KNEE: ICD-10-CM

## 2023-09-22 DIAGNOSIS — F41.9 ANXIETY: ICD-10-CM

## 2023-09-22 DIAGNOSIS — F32.A DEPRESSION, UNSPECIFIED DEPRESSION TYPE: ICD-10-CM

## 2023-09-22 LAB
25(OH)D3 SERPL-MCNC: 21 NG/ML (ref 30–100)
BASOPHILS # BLD AUTO: 0.7 % (ref 0–1.8)
BASOPHILS # BLD: 0.07 K/UL (ref 0–0.12)
EOSINOPHIL # BLD AUTO: 0.36 K/UL (ref 0–0.51)
EOSINOPHIL NFR BLD: 3.8 % (ref 0–6.9)
ERYTHROCYTE [DISTWIDTH] IN BLOOD BY AUTOMATED COUNT: 42.7 FL (ref 35.9–50)
HCT VFR BLD AUTO: 48.1 % (ref 37–47)
HGB BLD-MCNC: 15.7 G/DL (ref 12–16)
IMM GRANULOCYTES # BLD AUTO: 0.01 K/UL (ref 0–0.11)
IMM GRANULOCYTES NFR BLD AUTO: 0.1 % (ref 0–0.9)
LYMPHOCYTES # BLD AUTO: 4.05 K/UL (ref 1–4.8)
LYMPHOCYTES NFR BLD: 43.2 % (ref 22–41)
MCH RBC QN AUTO: 30.1 PG (ref 27–33)
MCHC RBC AUTO-ENTMCNC: 32.6 G/DL (ref 32.2–35.5)
MCV RBC AUTO: 92.3 FL (ref 81.4–97.8)
MONOCYTES # BLD AUTO: 0.59 K/UL (ref 0–0.85)
MONOCYTES NFR BLD AUTO: 6.3 % (ref 0–13.4)
NEUTROPHILS # BLD AUTO: 4.29 K/UL (ref 1.82–7.42)
NEUTROPHILS NFR BLD: 45.9 % (ref 44–72)
NRBC # BLD AUTO: 0 K/UL
NRBC BLD-RTO: 0 /100 WBC (ref 0–0.2)
PLATELET # BLD AUTO: 300 K/UL (ref 164–446)
PMV BLD AUTO: 11.2 FL (ref 9–12.9)
RBC # BLD AUTO: 5.21 M/UL (ref 4.2–5.4)
TSH SERPL DL<=0.005 MIU/L-ACNC: 1.76 UIU/ML (ref 0.38–5.33)
WBC # BLD AUTO: 9.4 K/UL (ref 4.8–10.8)

## 2023-09-22 PROCEDURE — 80053 COMPREHEN METABOLIC PANEL: CPT

## 2023-09-22 PROCEDURE — 3078F DIAST BP <80 MM HG: CPT | Performed by: FAMILY MEDICINE

## 2023-09-22 PROCEDURE — 82306 VITAMIN D 25 HYDROXY: CPT

## 2023-09-22 PROCEDURE — 90686 IIV4 VACC NO PRSV 0.5 ML IM: CPT | Performed by: FAMILY MEDICINE

## 2023-09-22 PROCEDURE — 99214 OFFICE O/P EST MOD 30 MIN: CPT | Mod: 25 | Performed by: FAMILY MEDICINE

## 2023-09-22 PROCEDURE — 36415 COLL VENOUS BLD VENIPUNCTURE: CPT

## 2023-09-22 PROCEDURE — 3074F SYST BP LT 130 MM HG: CPT | Performed by: FAMILY MEDICINE

## 2023-09-22 PROCEDURE — 80061 LIPID PANEL: CPT

## 2023-09-22 PROCEDURE — 85025 COMPLETE CBC W/AUTO DIFF WBC: CPT

## 2023-09-22 PROCEDURE — 90471 IMMUNIZATION ADMIN: CPT | Performed by: FAMILY MEDICINE

## 2023-09-22 PROCEDURE — 84443 ASSAY THYROID STIM HORMONE: CPT

## 2023-09-22 ASSESSMENT — FIBROSIS 4 INDEX: FIB4 SCORE: 0.29

## 2023-09-22 ASSESSMENT — PATIENT HEALTH QUESTIONNAIRE - PHQ9: CLINICAL INTERPRETATION OF PHQ2 SCORE: 0

## 2023-09-22 NOTE — PROGRESS NOTES
Subjective:     CC:   Chief Complaint   Patient presents with    Follow-Up       HPI:   Randi presents today for follow-up she needs refills of her sertraline which she feels is working well for her.  Patient is having discomfort to her left leg on further questioning it appears to be her knee has been bothering her for the last 2 months.  I did review our last note that was over a year ago patient was supposed to contact her GYN provider due to the fact she was wondering about the facial hair.  Patient states after talking to her relatives it seems to be more a hereditary trait.  Patient is going to be due for a female exam plus she will be due to have her Mirena removed next year.  I would recommend she contact her GYN provider who is still in town so she can schedule appointment.    Past Medical History:   Diagnosis Date    Anxiety     Depression     Nausea & vomiting 11/17/2021       Social History     Tobacco Use    Smoking status: Never    Smokeless tobacco: Never   Vaping Use    Vaping Use: Never used   Substance Use Topics    Alcohol use: Yes     Comment: Occ    Drug use: Never       Current Outpatient Medications Ordered in Epic   Medication Sig Dispense Refill    sertraline (ZOLOFT) 50 MG Tab TAKE ONE AND ONE-HALF TABLETS BY MOUTH DAILY 135 Tablet 0    levonorgestrel (MIRENA) 52 mg (20 mcg/24 hr) IUD 1 Intra Uterine Device by Intrauterine route See Admin Instructions. Change every 4 years       No current Epic-ordered facility-administered medications on file.       Allergies:  Hydrocodone and Penicillins    Health Maintenance: Completed    ROS:  Gen: no fevers/chills, patient has gained some weight since of last seen her  Eyes: no changes in vision  ENT: no sore throat, no hearing loss, no bloody nose  Pulm: no sob, no cough  CV: no chest pain, no palpitations  GI: no nausea/vomiting, no diarrhea  : no dysuria  Neuro: no headaches, no numbness/tingling  Heme/Lymph: no easy bruising    Objective:      Exam:  /64 (BP Location: Right arm, Patient Position: Sitting, BP Cuff Size: Large adult)   Pulse 77   Temp 36.8 °C (98.3 °F) (Temporal)   Resp 16   Ht 1.524 m (5')   Wt 96.3 kg (212 lb 3.2 oz)   SpO2 98%   BMI 41.44 kg/m²  Body mass index is 41.44 kg/m².    Gen: Alert and oriented, No apparent distress.  Skin: Warm and dry.  No obvious lesions.  Eyes: Sclera wnl Pupils normal in size  Lungs: Normal effort, CTA bilaterally, no wheezes, rhonchi, or rales  CV: Regular rate and rhythm. No murmurs, rubs, or gallops.  Musculoskeletal: Normal gait. No extremity cyanosis, clubbing, or edema.  On palpation patient has some discomfort on range of motion to her left knee there is no redness noted.  Neuro: Oriented to person, place and time  Psych: Mood is wnl     Labs: Fasting labs were ordered today patient states she is fasting    Assessment & Plan:     28 y.o. female with the following -     1. Vitamin D deficiency  I recommend we go ahead and check her vitamin D patient agreeable with the plan  - VITAMIN D,25 HYDROXY (DEFICIENCY); Future    2. Weight gain  I recommend we check her thyroid due to fact her weight has increased  - TSH; Future    3. Anxiety  Patient feels that the sertraline is helping with her anxiety and depression  - sertraline (ZOLOFT) 50 MG Tab; TAKE ONE AND ONE-HALF TABLETS BY MOUTH DAILY  Dispense: 135 Tablet; Refill: 0    4. Need for vaccination  - INFLUENZA VACCINE QUAD INJ (PF)    5. Wellness examination  - CBC WITH DIFFERENTIAL; Future  - Comp Metabolic Panel; Future  - Lipid Profile; Future    6. Depression, unspecified depression type  - sertraline (ZOLOFT) 50 MG Tab; TAKE ONE AND ONE-HALF TABLETS BY MOUTH DAILY  Dispense: 135 Tablet; Refill: 0    7.  Acute pain of left knee  Would recommend [facility] gave her the map so they can look into her left knee    Return in about 3 weeks (around 10/13/2023).    Please note that this dictation was created using  voice recognition software. I have made every reasonable attempt to correct obvious errors, but I expect that there are errors of grammar and possibly content that I did not discover before finalizing the note.

## 2023-09-23 LAB
ALBUMIN SERPL BCP-MCNC: 4.3 G/DL (ref 3.2–4.9)
ALBUMIN/GLOB SERPL: 1.7 G/DL
ALP SERPL-CCNC: 75 U/L (ref 30–99)
ALT SERPL-CCNC: 17 U/L (ref 2–50)
ANION GAP SERPL CALC-SCNC: 12 MMOL/L (ref 7–16)
AST SERPL-CCNC: 16 U/L (ref 12–45)
BILIRUB SERPL-MCNC: 0.4 MG/DL (ref 0.1–1.5)
BUN SERPL-MCNC: 12 MG/DL (ref 8–22)
CALCIUM ALBUM COR SERPL-MCNC: 8.9 MG/DL (ref 8.5–10.5)
CALCIUM SERPL-MCNC: 9.1 MG/DL (ref 8.5–10.5)
CHLORIDE SERPL-SCNC: 102 MMOL/L (ref 96–112)
CHOLEST SERPL-MCNC: 155 MG/DL (ref 100–199)
CO2 SERPL-SCNC: 25 MMOL/L (ref 20–33)
CREAT SERPL-MCNC: 0.9 MG/DL (ref 0.5–1.4)
FASTING STATUS PATIENT QL REPORTED: NORMAL
GFR SERPLBLD CREATININE-BSD FMLA CKD-EPI: 89 ML/MIN/1.73 M 2
GLOBULIN SER CALC-MCNC: 2.6 G/DL (ref 1.9–3.5)
GLUCOSE SERPL-MCNC: 86 MG/DL (ref 65–99)
HDLC SERPL-MCNC: 45 MG/DL
LDLC SERPL CALC-MCNC: 98 MG/DL
POTASSIUM SERPL-SCNC: 4.2 MMOL/L (ref 3.6–5.5)
PROT SERPL-MCNC: 6.9 G/DL (ref 6–8.2)
SODIUM SERPL-SCNC: 139 MMOL/L (ref 135–145)
TRIGL SERPL-MCNC: 60 MG/DL (ref 0–149)

## 2023-11-14 ENCOUNTER — APPOINTMENT (OUTPATIENT)
Dept: MEDICAL GROUP | Facility: PHYSICIAN GROUP | Age: 28
End: 2023-11-14
Payer: COMMERCIAL

## 2023-11-18 DIAGNOSIS — F41.9 ANXIETY: ICD-10-CM

## 2023-11-18 DIAGNOSIS — F32.A DEPRESSION, UNSPECIFIED DEPRESSION TYPE: ICD-10-CM

## 2023-12-21 ENCOUNTER — OFFICE VISIT (OUTPATIENT)
Dept: MEDICAL GROUP | Facility: PHYSICIAN GROUP | Age: 28
End: 2023-12-21
Payer: COMMERCIAL

## 2023-12-21 VITALS
OXYGEN SATURATION: 95 % | HEIGHT: 60 IN | RESPIRATION RATE: 16 BRPM | DIASTOLIC BLOOD PRESSURE: 74 MMHG | WEIGHT: 215.2 LBS | TEMPERATURE: 98.3 F | SYSTOLIC BLOOD PRESSURE: 126 MMHG | BODY MASS INDEX: 42.25 KG/M2 | HEART RATE: 82 BPM

## 2023-12-21 DIAGNOSIS — Z11.59 NEED FOR HEPATITIS C SCREENING TEST: ICD-10-CM

## 2023-12-21 DIAGNOSIS — F41.9 ANXIETY: ICD-10-CM

## 2023-12-21 DIAGNOSIS — Z30.9 ENCOUNTER FOR CONTRACEPTIVE MANAGEMENT, UNSPECIFIED TYPE: ICD-10-CM

## 2023-12-21 DIAGNOSIS — H61.21 IMPACTED CERUMEN OF RIGHT EAR: ICD-10-CM

## 2023-12-21 DIAGNOSIS — H65.03 NON-RECURRENT ACUTE SEROUS OTITIS MEDIA OF BOTH EARS: ICD-10-CM

## 2023-12-21 DIAGNOSIS — E55.9 VITAMIN D DEFICIENCY: ICD-10-CM

## 2023-12-21 DIAGNOSIS — F32.A DEPRESSION, UNSPECIFIED DEPRESSION TYPE: ICD-10-CM

## 2023-12-21 DIAGNOSIS — R63.5 WEIGHT GAIN: ICD-10-CM

## 2023-12-21 PROCEDURE — 3078F DIAST BP <80 MM HG: CPT | Performed by: FAMILY MEDICINE

## 2023-12-21 PROCEDURE — 3074F SYST BP LT 130 MM HG: CPT | Performed by: FAMILY MEDICINE

## 2023-12-21 PROCEDURE — 99214 OFFICE O/P EST MOD 30 MIN: CPT | Performed by: FAMILY MEDICINE

## 2023-12-21 ASSESSMENT — FIBROSIS 4 INDEX: FIB4 SCORE: 0.38

## 2023-12-21 NOTE — PROGRESS NOTES
Subjective:     CC:   Chief Complaint   Patient presents with    Follow-Up       HPI:   Randi presents today for follow-up of her labs done in September.  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  Patient is seen in Ortho for her back and also she is getting physical therapy due to the fact that she has a patella issue and her left leg.  Patient is scheduled to see GYN coming up this year for her female exam along with replacement of her Mirena.  Patient feels that the sertraline is working well for her and would like to continue this.  Patient states that her left ear seems like she hears some scratchy noises she is having some nasal congestion but blowing just clear material from her nose.  Past Medical History:   Diagnosis Date    Anxiety     Depression     Nausea & vomiting 11/17/2021       Social History     Tobacco Use    Smoking status: Never    Smokeless tobacco: Never   Vaping Use    Vaping Use: Never used   Substance Use Topics    Alcohol use: Yes     Comment: Occ    Drug use: Never       Current Outpatient Medications Ordered in Epic   Medication Sig Dispense Refill    sertraline (ZOLOFT) 50 MG Tab TAKE ONE AND ONE-HALF TABLETS BY MOUTH DAILY 135 Tablet 0    levonorgestrel (MIRENA) 52 mg (20 mcg/24 hr) IUD 1 Intra Uterine Device by Intrauterine route See Admin Instructions. Change every 4 years       No current Epic-ordered facility-administered medications on file.       Allergies:  Hydrocodone and Penicillins    Health Maintenance: Completed    ROS:  Gen: no fevers/chills, no changes in weight  Eyes: no changes in vision  ENT: no sore throat, no hearing loss, no bloody nose  Pulm: no sob, no cough  CV: no chest pain, no palpitations  GI: no nausea/vomiting, no diarrhea  : no dysuria  Neuro: no headaches, no numbness/tingling  Heme/Lymph: no easy bruising    Objective:      Exam:  /74 (BP Location: Right arm, Patient Position: Sitting, BP Cuff Size: Adult)   Pulse 82   Temp 36.8 °C (98.3 °F) (Temporal)   Resp 16   Ht 1.524 m (5')   Wt 97.6 kg (215 lb 3.2 oz)   SpO2 95%   BMI 42.03 kg/m²  Body mass index is 42.03 kg/m².    Gen: Alert and oriented, No apparent distress.  Skin: Warm and dry.  No obvious lesions.  Eyes: Sclera wnl Pupils normal in size  ENT: Unable to see eardrum on the right due to cerumen on the left canal is clear TM has some fluid that is clear behind it.  After irrigation was able to see the eardrum on the right no signs of redness.  Mouth negative redness or exudates  Lungs: Normal effort, CTA bilaterally, no wheezes, rhonchi, or rales  CV: Regular rate and rhythm. No murmurs, rubs, or gallops.  Musculoskeletal: Normal gait. No extremity cyanosis, clubbing, or edema.  Neuro: Oriented to person, place and time  Psych: Mood is wnl         Assessment & Plan:     28 y.o. female with the following -     1. Vitamin D deficiency  On review of her labs from September her vitamin D was low would recommend she start taking 2000 IUs of vitamin D3 per day we will recheck her again in 4 months patient agreeable with the plan    2. Weight gain  Patient expressed interest in seeing nutrition also recommended weight watchers but I will see her back in 4 months and see how she is doing on her weight.  - Referral to Nutrition Services    3. Impacted cerumen of right ear  The cerumen from her right ear was cleared irrigation    4. Anxiety  Patient is doing well on present medication  - sertraline (ZOLOFT) 50 MG Tab; TAKE ONE AND ONE-HALF TABLETS BY MOUTH DAILY  Dispense: 135 Tablet; Refill: 0    5. Depression, unspecified depression type  - sertraline (ZOLOFT) 50 MG Tab; TAKE ONE AND ONE-HALF TABLETS BY MOUTH DAILY  Dispense: 135 Tablet; Refill: 0    6. Encounter for contraceptive management, unspecified type  Patient is planning on seeing GYN for replacement of her  Mirena along with her Pap smear    7. Non-recurrent acute serous otitis media of both ears  With the fluid behind both TMs and her drainage that is clear would recommend Flonase nasal spray she can get over-the-counter 2 sprays into each nostril once a day.  I warned her it may take 1 to 2 weeks to start working.       Return in about 4 months (around 4/21/2024), or if symptoms worsen or fail to improve.  I did go over the rest of her labs from September lipid, CBC and comprehensive panel along with thyroid was within normal limits    Please note that this dictation was created using voice recognition software. I have made every reasonable attempt to correct obvious errors, but I expect that there are errors of grammar and possibly content that I did not discover before finalizing the note.

## 2024-03-06 ENCOUNTER — OFFICE VISIT (OUTPATIENT)
Dept: URGENT CARE | Facility: CLINIC | Age: 29
End: 2024-03-06
Payer: COMMERCIAL

## 2024-03-06 VITALS
HEIGHT: 60 IN | DIASTOLIC BLOOD PRESSURE: 89 MMHG | HEART RATE: 68 BPM | BODY MASS INDEX: 39.27 KG/M2 | TEMPERATURE: 97.6 F | SYSTOLIC BLOOD PRESSURE: 138 MMHG | OXYGEN SATURATION: 96 % | WEIGHT: 200 LBS | RESPIRATION RATE: 20 BRPM

## 2024-03-06 DIAGNOSIS — R10.31 RLQ ABDOMINAL PAIN: ICD-10-CM

## 2024-03-06 DIAGNOSIS — R10.9 ABDOMINAL PAIN IN FEMALE PATIENT: ICD-10-CM

## 2024-03-06 PROCEDURE — 3075F SYST BP GE 130 - 139MM HG: CPT | Performed by: PHYSICIAN ASSISTANT

## 2024-03-06 PROCEDURE — 99215 OFFICE O/P EST HI 40 MIN: CPT | Performed by: PHYSICIAN ASSISTANT

## 2024-03-06 PROCEDURE — 3079F DIAST BP 80-89 MM HG: CPT | Performed by: PHYSICIAN ASSISTANT

## 2024-03-06 ASSESSMENT — ENCOUNTER SYMPTOMS
BLOOD IN STOOL: 0
FEVER: 0
NEUROLOGICAL NEGATIVE: 1
VOMITING: 1
CARDIOVASCULAR NEGATIVE: 1
RESPIRATORY NEGATIVE: 1
CHILLS: 0
CONSTIPATION: 0
ABDOMINAL PAIN: 1
ANOREXIA: 1
NAUSEA: 1
DIARRHEA: 0

## 2024-03-06 ASSESSMENT — FIBROSIS 4 INDEX: FIB4 SCORE: 0.38

## 2024-03-06 NOTE — PROGRESS NOTES
"Luis Miguel Farmer is a very pleasant 28 y.o. female who presents with Abdominal Pain (\"Severe lower abd pain, feels like body is splitting in half\" X 2 hours ago, no BM since this morning)            Abdominal Pain  This is a new problem. The current episode started today. The onset quality is sudden. The problem occurs constantly. The problem has been rapidly worsening. The pain is located in the RLQ. The quality of the pain is sharp and tearing. The abdominal pain does not radiate. Associated symptoms include anorexia, nausea and vomiting. Pertinent negatives include no constipation, diarrhea, dysuria, fever or frequency. She has tried acetaminophen for the symptoms. The treatment provided no relief.       PMH:  has a past medical history of Anxiety, Depression, and Nausea & vomiting (11/17/2021).    She has no past medical history of Addisons disease (HCC), Adrenal disorder (HCC), Allergy, Anemia, Arrhythmia, Arthritis, Asthma, Cancer (HCC), Cataract, CHF (congestive heart failure) ([facility]), Clotting disorder (HCC), COPD (chronic obstructive pulmonary disease) ([facility]), Cushings syndrome ([facility]), Diabetes (HCC), Diabetic neuropathy (HCC), Glaucoma, Goiter, Head ache, Heart attack (HCC), Heart murmur, HIV (human immunodeficiency virus infection) ([facility]), Hyperlipidemia, Hypertension, IBD (inflammatory bowel disease), Kidney disease, Meningitis, Migraine, Muscle disorder, Osteoporosis, Parathyroid disorder (HCC), Pituitary disease (HCC), Pulmonary emphysema (HCC), Seizure ([facility]), Sickle cell disease ([facility]), Stroke ([facility]), Substance abuse ([facility]), Thyroid disease, Tuberculosis, or Urinary tract infection.  MEDS:   Current Outpatient Medications:     sertraline (ZOLOFT) 50 MG Tab, TAKE ONE AND ONE-HALF TABLETS BY MOUTH DAILY, Disp: 135 Tablet, Rfl: 0    levonorgestrel (MIRENA) 52 mg (20 mcg/24 hr) IUD, 1 Intra Uterine Device by Intrauterine route See Admin Instructions. Change every 4 years, Disp: , Rfl: "   ALLERGIES:   Allergies   Allergen Reactions    Hydrocodone Rash     vomiting       Penicillins Rash and Vomiting     Rash  Sever rash, fever, vomiting      SURGHX:   Past Surgical History:   Procedure Laterality Date    APPENDECTOMY      OTHER      ankle surgeries     SOCHX:  reports that she has never smoked. She has never used smokeless tobacco. She reports that she does not currently use alcohol. She reports that she does not use drugs.  FH: family history includes Alcohol abuse in her father; Asthma in her sister; Cancer in her mother; No Known Problems in her brother.      Review of Systems   Constitutional:  Negative for chills and fever.   Respiratory: Negative.     Cardiovascular: Negative.    Gastrointestinal:  Positive for abdominal pain, anorexia, nausea and vomiting. Negative for blood in stool, constipation and diarrhea.   Genitourinary: Negative.  Negative for dysuria and frequency.   Neurological: Negative.        Medications, Allergies, and current problem list reviewed today in Epic           Objective     /89   Pulse 68   Temp 36.4 °C (97.6 °F) (Temporal)   Resp 20   Ht 1.524 m (5')   Wt 90.7 kg (200 lb)   SpO2 96%   BMI 39.06 kg/m²      Physical Exam  Vitals and nursing note reviewed.   Constitutional:       General: She is not in acute distress.     Appearance: Normal appearance. She is well-developed. She is not ill-appearing, toxic-appearing or diaphoretic.   HENT:      Head: Normocephalic and atraumatic.      Right Ear: Hearing and external ear normal.      Left Ear: Hearing and external ear normal.      Nose: Nose normal.   Eyes:      General:         Right eye: No discharge.         Left eye: No discharge.      Conjunctiva/sclera: Conjunctivae normal.   Cardiovascular:      Rate and Rhythm: Normal rate and regular rhythm.      Heart sounds: Normal heart sounds.   Pulmonary:      Effort: Pulmonary effort is normal. No respiratory distress.      Breath sounds: Normal breath  sounds. No wheezing.   Abdominal:      General: Abdomen is flat. Bowel sounds are normal. There is no distension.      Palpations: Abdomen is soft.      Tenderness: There is abdominal tenderness in the right lower quadrant. There is guarding and rebound. Positive signs include McBurney's sign.      Hernia: No hernia is present.   Musculoskeletal:      Cervical back: Normal range of motion and neck supple.   Skin:     General: Skin is warm and dry.   Neurological:      General: No focal deficit present.      Mental Status: She is alert and oriented to person, place, and time.   Psychiatric:         Mood and Affect: Mood normal.         Behavior: Behavior normal.         Thought Content: Thought content normal.         Judgment: Judgment normal.                             Assessment & Plan        1. Abdominal pain in female patient  CANCELED: POCT Urinalysis    CANCELED: POCT Pregnancy      2. RLQ abdominal pain          Concern for acute abdomen.  Patient will go to the main ER now for higher level of care including imaging and lab work which I cannot obtain in the urgent care for the next several hours.      Please note that this dictation was created using voice recognition software. I have made every reasonable attempt to correct obvious errors, but I expect that there are errors of grammar and possibly content that I did not discover before finalizing the note.

## 2024-05-15 ENCOUNTER — APPOINTMENT (OUTPATIENT)
Dept: OBGYN | Facility: CLINIC | Age: 29
End: 2024-05-15
Payer: COMMERCIAL

## 2024-05-17 ENCOUNTER — GYNECOLOGY VISIT (OUTPATIENT)
Dept: OBGYN | Facility: CLINIC | Age: 29
End: 2024-05-17
Payer: COMMERCIAL

## 2024-05-17 ENCOUNTER — HOSPITAL ENCOUNTER (OUTPATIENT)
Facility: MEDICAL CENTER | Age: 29
End: 2024-05-17
Attending: PHYSICIAN ASSISTANT
Payer: COMMERCIAL

## 2024-05-17 VITALS — WEIGHT: 208 LBS | DIASTOLIC BLOOD PRESSURE: 70 MMHG | SYSTOLIC BLOOD PRESSURE: 112 MMHG | BODY MASS INDEX: 40.62 KG/M2

## 2024-05-17 DIAGNOSIS — Z12.4 SCREENING FOR CERVICAL CANCER: ICD-10-CM

## 2024-05-17 DIAGNOSIS — L68.0 HIRSUTISM: ICD-10-CM

## 2024-05-17 DIAGNOSIS — Z01.419 WELL WOMAN EXAM: ICD-10-CM

## 2024-05-17 PROCEDURE — 3074F SYST BP LT 130 MM HG: CPT | Performed by: PHYSICIAN ASSISTANT

## 2024-05-17 PROCEDURE — 3078F DIAST BP <80 MM HG: CPT | Performed by: PHYSICIAN ASSISTANT

## 2024-05-17 PROCEDURE — 99395 PREV VISIT EST AGE 18-39: CPT | Performed by: PHYSICIAN ASSISTANT

## 2024-05-17 ASSESSMENT — FIBROSIS 4 INDEX: FIB4 SCORE: .3880570000581327576

## 2024-05-17 NOTE — PROGRESS NOTES
ANNUAL GYNECOLOGY VISIT    Chief Complaint  Annual    Subjective  Randi Farmer is a 29 y.o. female  No LMP recorded. Patient has had an implant. using Mirena placed 2020 for contraception who presents today for Annual Exam.      Pt reports hirsutism and hair to check. Hx of regular but heavy cycles before Mirena. No acne, hair loss. Hx of weight gain since Mirena.       Preventive Care   Immunization History   Administered Date(s) Administered    HPV Bivalent Vaccine (CERVARIX) - HISTORICAL DATA 2006, 2009    HPV Quadrivalent Vaccine (GARDASIL) - HISTORICAL DATA 2006, 2009    Hepatitis A Vaccine, Adult 2002, 2015    Influenza Seasonal Injectable - Historical Data 2005    Influenza Vaccine Adult HD 2015    Influenza Vaccine Quad Inj (Pf) 2015, 2021, 2023    MMR Vaccine 2018, 2021    Meningococcal Conjugate Vaccine MCV4 (MENVEO) 06/15/2005    Meningococcal Conjugate Vaccine MCV4 (Menactra) 06/15/2005    PFIZER PURPLE CAP SARS-COV-2 VACCINATION (12+) 2021, 2021    Tdap Vaccine 2015       Guardasil HPV vaccine: UTD    Gynecology History and ROS  Current Sexual Activity: yes - monogamous male   History of sexually transmitted diseases? no  Abnormal discharge? no  Current Contraception:  Mirena IUD    Menstrual History  No LMP recorded. Patient has had an implant.  Amenorrheic with Mirena     Pap History  Last pap smear: 2020 NILM  History of moderate or severe dysplasia: no    Cancer Risk Assessement:  Family history of:   - Breast cancer: Mother    - Ovarian cancer: no   - Uterine cancer: no   - Colon cancer: no    Obstetric History  OB History    Para Term  AB Living   0 0 0 0 0 0   SAB IAB Ectopic Molar Multiple Live Births   0 0 0 0 0 0       Past Medical History  Past Medical History:   Diagnosis Date    Anxiety     Depression     Nausea & vomiting 2021       Past Surgical  History  Past Surgical History:   Procedure Laterality Date    APPENDECTOMY      OTHER      ankle surgeries       Social History  Social History     Tobacco Use    Smoking status: Never    Smokeless tobacco: Never   Vaping Use    Vaping status: Never Used   Substance Use Topics    Alcohol use: Not Currently     Comment: Occ    Drug use: Never        Family History  Family History   Problem Relation Age of Onset    Cancer Mother     Alcohol abuse Father     Asthma Sister     No Known Problems Brother        Home Medications  Current Outpatient Medications   Medication Sig    sertraline (ZOLOFT) 50 MG Tab TAKE ONE AND ONE-HALF TABLETS BY MOUTH DAILY    levonorgestrel (MIRENA) 52 mg (20 mcg/24 hr) IUD 1 Intra Uterine Device by Intrauterine route See Admin Instructions. Change every 4 years       Allergies/Reactions  Allergies   Allergen Reactions    Hydrocodone Rash     vomiting       Penicillins Rash and Vomiting     Rash  Sever rash, fever, vomiting        ROS  Positive ROS: see HPI  Gen: no fevers or chills, no significant weight loss or gain, excessive fatigue  Respiratory:  no cough or dyspnea  Cardiac:  no chest pain, no palpitations, no syncope  Breast: no breast discharge, pain, lump or skin changes  GI:  no heartburn, no abdominal pain, no nausea or vomiting  Urinary: no dysuria, urgency, frequency, incontinence   Psych: no depression or anxiety  Neuro: no migraines with aura, fainting spells, numbness or tingling  Extremities: no joint pain, persistently swollen ankles, recurrent leg cramps      Physical Examination:  Vital Signs: There were no vitals taken for this visit.      Constitutional: The patient is well developed and well nourished.  Psychiatric: Patient is oriented to time place and person.   Skin: No rash observed.  Neck: Appears symmetric. Thyroid normal size  Respiratory: normal effort  Breast: Inspection reveals no asymetry or nipple discharge, no skin thickening, dimpling or erythema.   Palpation demonstrates no masses.  Abdomen: Soft, non-tender.  Pelvic Exam:      Vulva: external female genitalia are normal in appearance. No lesions     Urethra - no lesions, no erythema     Vagina: moist, pink, normal ruggae     Cervix: pink, smooth, no lesions, no CMT     Uterus - non-tender, normal size, shape, contour, mobile, retroverted     Ovaries: non-tender, no appreciable masses    Pap Smear performed: Yes    Chaperone Present: CLEMENTINA Byrnes  Extremeties: Legs are symmetric and without tenderness. There is no edema present.        Assessment & Plan  Randi Farmer is a 29 y.o. female who presents today for Annual Gyn Exam.     1. Well woman exam    -Anticipatory guidance given. Encouraged adequate water intake, healthy diet, regular exercise. Educated on Pap smear screening and guidelines for age per ACOG and ASSCP. Discussed safe sex, STI prevention, contraception/family planning. Self breast exam taught.     2. Screening for cervical cancer    - THINPREP PAP WITH HPV; Future    3. Hirsutism    - Pt meets 0/3 [place] criteria for PCOS. Only sx is hirsutism. Will send referral to dermatology for further eval as facial and body hair is bothersome and not desired.   - Referral to Dermatology            Return: Annually or PRN    Brittney Zhang P.A.-C.  [facility]

## 2024-05-17 NOTE — PROGRESS NOTES
Patient here for GYN exam.  LMP= N/A  BCM:Mirena placed dec 2020  Last pap: 9/29/2020  Phone number: [phone] (home)   Pharmacy verified    Pt states that she has been having unwanted hair growth for about a year on her face, neck and chest.

## 2024-05-18 DIAGNOSIS — Z12.4 SCREENING FOR CERVICAL CANCER: ICD-10-CM

## 2024-05-22 LAB
CYTOLOGIST CVX/VAG CYTO: NORMAL
CYTOLOGY CVX/VAG DOC CYTO: NORMAL
CYTOLOGY CVX/VAG DOC THIN PREP: NORMAL
HPV I/H RISK 4 DNA CVX QL PROBE+SIG AMP: NEGATIVE
NOTE NL11727A: NORMAL
OTHER STN SPEC: NORMAL
STAT OF ADQ CVX/VAG CYTO-IMP: NORMAL

## 2024-10-02 DIAGNOSIS — F41.9 ANXIETY: ICD-10-CM

## 2024-10-02 DIAGNOSIS — F32.A DEPRESSION, UNSPECIFIED DEPRESSION TYPE: ICD-10-CM

## 2024-11-13 ENCOUNTER — HOSPITAL ENCOUNTER (INPATIENT)
Facility: MEDICAL CENTER | Age: 29
LOS: 1 days | DRG: 519 | End: 2024-11-14
Attending: EMERGENCY MEDICINE | Admitting: NEUROLOGICAL SURGERY
Payer: COMMERCIAL

## 2024-11-13 DIAGNOSIS — M51.26 LUMBAR DISC HERNIATION: ICD-10-CM

## 2024-11-13 DIAGNOSIS — M54.16 LUMBAR RADICULOPATHY: ICD-10-CM

## 2024-11-13 DIAGNOSIS — Z01.818 PREOP TESTING: ICD-10-CM

## 2024-11-13 DIAGNOSIS — R52 INTRACTABLE PAIN: ICD-10-CM

## 2024-11-13 PROBLEM — M48.061 LUMBAR STENOSIS WITHOUT NEUROGENIC CLAUDICATION: Status: ACTIVE | Noted: 2024-11-13

## 2024-11-13 LAB
APTT PPP: 27.7 SEC (ref 24.7–36)
CFT BLD TEG: 3.7 MIN (ref 4.6–9.1)
CFT P HPASE BLD TEG: 4.2 MIN (ref 4.3–8.3)
CLOT ANGLE BLD TEG: 76.4 DEGREES (ref 63–78)
CT.EXTRINSIC BLD ROTEM: 0.9 MIN (ref 0.8–2.1)
INR PPP: 0.97 (ref 0.87–1.13)
MCF BLD TEG: 63.3 MM (ref 52–69)
MCF.PLATELET INHIB BLD ROTEM: 20 MM (ref 15–32)
PA AA BLD-ACNC: 3.7 % (ref 0–11)
PA ADP BLD-ACNC: 3.7 % (ref 0–17)
PROTHROMBIN TIME: 12.9 SEC (ref 12–14.6)
TEG ALGORITHM TGALG: ABNORMAL

## 2024-11-13 PROCEDURE — 700111 HCHG RX REV CODE 636 W/ 250 OVERRIDE (IP): Performed by: EMERGENCY MEDICINE

## 2024-11-13 PROCEDURE — A9270 NON-COVERED ITEM OR SERVICE: HCPCS | Performed by: NURSE PRACTITIONER

## 2024-11-13 PROCEDURE — 85347 COAGULATION TIME ACTIVATED: CPT

## 2024-11-13 PROCEDURE — 36415 COLL VENOUS BLD VENIPUNCTURE: CPT

## 2024-11-13 PROCEDURE — 99285 EMERGENCY DEPT VISIT HI MDM: CPT

## 2024-11-13 PROCEDURE — 85730 THROMBOPLASTIN TIME PARTIAL: CPT

## 2024-11-13 PROCEDURE — 85384 FIBRINOGEN ACTIVITY: CPT

## 2024-11-13 PROCEDURE — 700101 HCHG RX REV CODE 250: Performed by: EMERGENCY MEDICINE

## 2024-11-13 PROCEDURE — G0378 HOSPITAL OBSERVATION PER HR: HCPCS

## 2024-11-13 PROCEDURE — 85610 PROTHROMBIN TIME: CPT

## 2024-11-13 PROCEDURE — 96374 THER/PROPH/DIAG INJ IV PUSH: CPT

## 2024-11-13 PROCEDURE — 700102 HCHG RX REV CODE 250 W/ 637 OVERRIDE(OP): Performed by: NURSE PRACTITIONER

## 2024-11-13 PROCEDURE — 85576 BLOOD PLATELET AGGREGATION: CPT | Mod: 91

## 2024-11-13 PROCEDURE — 700105 HCHG RX REV CODE 258: Performed by: EMERGENCY MEDICINE

## 2024-11-13 RX ORDER — ONDANSETRON 4 MG/1
4 TABLET, ORALLY DISINTEGRATING ORAL ONCE
Status: COMPLETED | OUTPATIENT
Start: 2024-11-13 | End: 2024-11-13

## 2024-11-13 RX ORDER — OXYCODONE AND ACETAMINOPHEN 10; 325 MG/1; MG/1
1 TABLET ORAL EVERY 4 HOURS PRN
Status: DISCONTINUED | OUTPATIENT
Start: 2024-11-13 | End: 2024-11-14 | Stop reason: HOSPADM

## 2024-11-13 RX ORDER — ONDANSETRON 2 MG/ML
4 INJECTION INTRAMUSCULAR; INTRAVENOUS EVERY 4 HOURS PRN
Status: DISCONTINUED | OUTPATIENT
Start: 2024-11-13 | End: 2024-11-14

## 2024-11-13 RX ORDER — HYDROMORPHONE HYDROCHLORIDE 1 MG/ML
1 INJECTION, SOLUTION INTRAMUSCULAR; INTRAVENOUS; SUBCUTANEOUS ONCE
Status: COMPLETED | OUTPATIENT
Start: 2024-11-13 | End: 2024-11-14

## 2024-11-13 RX ADMIN — ONDANSETRON 4 MG: 4 TABLET, ORALLY DISINTEGRATING ORAL at 20:47

## 2024-11-13 RX ADMIN — OXYCODONE AND ACETAMINOPHEN 1 TABLET: 10; 325 TABLET ORAL at 16:55

## 2024-11-13 RX ADMIN — KETAMINE HYDROCHLORIDE 25 MG: 10 INJECTION INTRAMUSCULAR; INTRAVENOUS at 14:20

## 2024-11-13 RX ADMIN — OXYCODONE AND ACETAMINOPHEN 1 TABLET: 10; 325 TABLET ORAL at 22:01

## 2024-11-13 RX ADMIN — SERTRALINE 75 MG: 50 TABLET, FILM COATED ORAL at 16:51

## 2024-11-13 SDOH — ECONOMIC STABILITY: TRANSPORTATION INSECURITY
IN THE PAST 12 MONTHS, HAS LACK OF RELIABLE TRANSPORTATION KEPT YOU FROM MEDICAL APPOINTMENTS, MEETINGS, WORK OR FROM GETTING THINGS NEEDED FOR DAILY LIVING?: NO

## 2024-11-13 SDOH — ECONOMIC STABILITY: TRANSPORTATION INSECURITY
IN THE PAST 12 MONTHS, HAS THE LACK OF TRANSPORTATION KEPT YOU FROM MEDICAL APPOINTMENTS OR FROM GETTING MEDICATIONS?: NO

## 2024-11-13 ASSESSMENT — PAIN DESCRIPTION - PAIN TYPE
TYPE: ACUTE PAIN

## 2024-11-13 ASSESSMENT — SOCIAL DETERMINANTS OF HEALTH (SDOH)

## 2024-11-13 ASSESSMENT — LIFESTYLE VARIABLES
HAVE YOU EVER FELT YOU SHOULD CUT DOWN ON YOUR DRINKING: NO
HOW MANY TIMES IN THE PAST YEAR HAVE YOU HAD 5 OR MORE DRINKS IN A DAY: 0
EVER FELT BAD OR GUILTY ABOUT YOUR DRINKING: NO
EVER HAD A DRINK FIRST THING IN THE MORNING TO STEADY YOUR NERVES TO GET RID OF A HANGOVER: NO
TOTAL SCORE: 0
CONSUMPTION TOTAL: NEGATIVE
AVERAGE NUMBER OF DAYS PER WEEK YOU HAVE A DRINK CONTAINING ALCOHOL: 0
DOES PATIENT WANT TO STOP DRINKING: NO
ALCOHOL_USE: YES
ON A TYPICAL DAY WHEN YOU DRINK ALCOHOL HOW MANY DRINKS DO YOU HAVE: 0
HAVE PEOPLE ANNOYED YOU BY CRITICIZING YOUR DRINKING: NO

## 2024-11-13 ASSESSMENT — PATIENT HEALTH QUESTIONNAIRE - PHQ9
1. LITTLE INTEREST OR PLEASURE IN DOING THINGS: NOT AT ALL
2. FEELING DOWN, DEPRESSED, IRRITABLE, OR HOPELESS: NOT AT ALL
SUM OF ALL RESPONSES TO PHQ9 QUESTIONS 1 AND 2: 0

## 2024-11-13 ASSESSMENT — FIBROSIS 4 INDEX
FIB4 SCORE: .3880570000581327576
FIB4 SCORE: .3880570000581327576

## 2024-11-13 NOTE — ED PROVIDER NOTES
"ER Provider Note    Scribed for Steven De La Torre Ii, M.d. by Jassi Handy. 11/13/2024  1:11 PM    Primary Care Provider: Xi Schwarz M.D.    CHIEF COMPLAINT   Chief Complaint   Patient presents with    Back Pain     Reports as severe pain that has progressively worsened over the last week. Denies any injuries/traumas. Has hx of herniated disc so unsure if related. Pt reports pain is causing it to become harder to get around and is shooting down L leg. Pt denies any incontinence.      EXTERNAL RECORDS REVIEWED  Outpatient Notes PT note from Fall 2023 discussed lumbar pain with left leg radiculopathy and Outpatient labs & studies MRI report from Feb 11th, 2024 showed herniated disc between L4 and L5    HPI/ROS    LIMITATION TO HISTORY   Select: : None    Ranid Farmer is a 29 y.o. female who presents to the ED complaining of worsening chronic back pain onset prior to arrival. She has a history of back pain but it has not been as bad as it is now. Her pain has worsened so much that her left leg is now numb. She was going to PT and has been doing at home exercises. Also has been seen at [facility] where she has received three epidurals as well as cortisone injections but they have been minimally effective. Her pain over the past week and a half has been unbearable so she presents to the ED. She has discussed possible surgery with Dr. Berry of [facility], spoke with him today, and he has requested ERP consultation. She has had prior MRI imaging which showed herniated disc which is pressing against her sciatic nerve. She has not had any bladder incontinence but notes that going to the bathroom is \"extremely difficult\". She has been taking Percocet for her pain.    PAST MEDICAL HISTORY  Past Medical History:   Diagnosis Date    Anxiety     Depression     Nausea & vomiting 11/17/2021       SURGICAL HISTORY  Past Surgical History:   Procedure Laterality Date    APPENDECTOMY      OTHER      ankle " surgeries       FAMILY HISTORY  Family History   Problem Relation Age of Onset    Cancer Mother     Alcohol abuse Father     Asthma Sister     No Known Problems Brother        SOCIAL HISTORY   reports that she has never smoked. She has never used smokeless tobacco. She reports that she does not currently use alcohol. She reports that she does not use drugs.    CURRENT MEDICATIONS  Previous Medications    LEVONORGESTREL (MIRENA) 52 MG (20 MCG/24 HR) IUD    1 Intra Uterine Device by Intrauterine route continuous. Continuous  Change every 4 years    MELATONIN 5 MG TAB    Take 5 mg by mouth every evening.    SERTRALINE (ZOLOFT) 50 MG TAB    TAKE ONE AND ONE-HALF TABLETS BY MOUTH DAILY.  Please keep your appointment as planned in November       ALLERGIES  Hydrocodone and Penicillins    PHYSICAL EXAM  /86   Pulse (!) 101   Temp 36.1 °C (97 °F) (Temporal)   Resp 18   Ht 1.524 m (5')   Wt 96.5 kg (212 lb 11.9 oz)   SpO2 94%   BMI 41.55 kg/m²   Physical Exam  Vitals and nursing note reviewed.   Constitutional:       Appearance: Normal appearance.   Cardiovascular:      Rate and Rhythm: Normal rate.   Pulmonary:      Effort: Pulmonary effort is normal.   Musculoskeletal:      Comments: Tenderness at left lower back, paraspinal region L4-L5. No edema at LLQ. Well perfused foot. Strength 4/5 with plantar flexion and extension of foot. Strength at knee is limited due to pain. Numbness at left posterior calf.     Neurological:      General: No focal deficit present.      Mental Status: She is alert.      Comments: Numbness in left posterior calf        COURSE & MEDICAL DECISION MAKING     ASSESSMENT, COURSE AND PLAN  Care Narrative: This is a 29 year old female presenting to the ED for acute on chronic back pain, MRI in Feb 2024 showed herniated disc in L4-L5 with compression of sciatic nerve. Over the past week and a half her pain has been uncontrolled and she now has left leg numbness. No saddle anesthesia or  bladder incontinence. She spoke with her spine surgeon Dr. Berry who recommended coming to the ED for pain control and requests consultation.      1:21 PM - Paged her spine surgeon Dr. Berry.     2:23 PM - Spoke with Yumiko Escalante who is the mid-level for Dr. Berry, they will arrange for admission and will plan for surgery tomorrow.     ED OBS: No; Patient does not meet criteria for ED Observation.     PROBLEM LIST  #Radiculopathy with L4-L5 herniated disc    #Intractable pain    DISPOSITION AND DISCUSSIONS  I have discussed management of the patient with the following physicians and ARNOLDO's:  Boris (Spine mid-level)    Discussion of management with other QHP or appropriate source(s): None     Barriers to care at this time, including but not limited to: None    Decision tools and prescription drugs considered including, but not limited to: Pain Medications given here .    DISPOSITION:  Patient will be hospitalized by Dr. Berry in guarded condition.     FINAL DIANGOSIS  1. Preop testing    2. Intractable pain    3. Lumbar radiculopathy    4. Lumbar disc herniation         Jassi MILLER (Ingrid), am scribing for, and in the presence of, LIUDMILA Steve II.    Electronically signed by: Jassi Handy (Ingrid), 11/13/2024    ISteven II, M* personally performed the services described in this documentation, as scribed by Jassi Handy in my presence, and it is both accurate and complete.      The note accurately reflects work and decisions made by me.  Steven De La Torre II, M.D.  11/13/2024  8:21 PM

## 2024-11-13 NOTE — ED TRIAGE NOTES
Chief Complaint   Patient presents with    Back Pain     Reports as severe pain that has progressively worsened over the last week. Denies any injuries/traumas. Has hx of herniated disc so unsure if related. Pt reports pain is causing it to become harder to get around and is shooting down L leg. Pt denies any incontinence.      /86   Pulse (!) 101   Temp 36.1 °C (97 °F) (Temporal)   Resp 18   Ht 1.524 m (5')   Wt 96.5 kg (212 lb 11.9 oz)   SpO2 94%   BMI 41.55 kg/m²     Pt ambulatory to triage w/ steady gait for above complaint.     Pt reports starting to have difficulty walking due to pain. Takes percocet 325mg 1.5 tab twice daily w/o relief. Last dose this am.     Denies any other pertinent medical history.  Placed pt back in lobby, educated on NPO status.

## 2024-11-14 ENCOUNTER — ANESTHESIA EVENT (OUTPATIENT)
Dept: SURGERY | Facility: MEDICAL CENTER | Age: 29
DRG: 519 | End: 2024-11-14
Payer: COMMERCIAL

## 2024-11-14 ENCOUNTER — ANESTHESIA (OUTPATIENT)
Dept: SURGERY | Facility: MEDICAL CENTER | Age: 29
DRG: 519 | End: 2024-11-14
Payer: COMMERCIAL

## 2024-11-14 ENCOUNTER — APPOINTMENT (OUTPATIENT)
Dept: RADIOLOGY | Facility: MEDICAL CENTER | Age: 29
DRG: 519 | End: 2024-11-14
Attending: NEUROLOGICAL SURGERY
Payer: COMMERCIAL

## 2024-11-14 VITALS
BODY MASS INDEX: 41.46 KG/M2 | HEIGHT: 60 IN | OXYGEN SATURATION: 90 % | WEIGHT: 211.2 LBS | DIASTOLIC BLOOD PRESSURE: 73 MMHG | SYSTOLIC BLOOD PRESSURE: 125 MMHG | HEART RATE: 103 BPM | RESPIRATION RATE: 16 BRPM | TEMPERATURE: 97 F

## 2024-11-14 LAB
ANION GAP SERPL CALC-SCNC: 12 MMOL/L (ref 7–16)
BUN SERPL-MCNC: 15 MG/DL (ref 8–22)
CALCIUM SERPL-MCNC: 9.5 MG/DL (ref 8.5–10.5)
CHLORIDE SERPL-SCNC: 103 MMOL/L (ref 96–112)
CO2 SERPL-SCNC: 22 MMOL/L (ref 20–33)
CREAT SERPL-MCNC: 0.7 MG/DL (ref 0.5–1.4)
ERYTHROCYTE [DISTWIDTH] IN BLOOD BY AUTOMATED COUNT: 43.1 FL (ref 35.9–50)
GFR SERPLBLD CREATININE-BSD FMLA CKD-EPI: 119 ML/MIN/1.73 M 2
GLUCOSE SERPL-MCNC: 96 MG/DL (ref 65–99)
HCG SERPL QL: NEGATIVE
HCT VFR BLD AUTO: 46.6 % (ref 37–47)
HGB BLD-MCNC: 15.2 G/DL (ref 12–16)
MCH RBC QN AUTO: 30.9 PG (ref 27–33)
MCHC RBC AUTO-ENTMCNC: 32.6 G/DL (ref 32.2–35.5)
MCV RBC AUTO: 94.7 FL (ref 81.4–97.8)
PLATELET # BLD AUTO: 308 K/UL (ref 164–446)
PMV BLD AUTO: 10.2 FL (ref 9–12.9)
POTASSIUM SERPL-SCNC: 4.7 MMOL/L (ref 3.6–5.5)
RBC # BLD AUTO: 4.92 M/UL (ref 4.2–5.4)
SODIUM SERPL-SCNC: 137 MMOL/L (ref 135–145)
WBC # BLD AUTO: 13.2 K/UL (ref 4.8–10.8)

## 2024-11-14 PROCEDURE — 110371 HCHG SHELL REV 272: Performed by: NEUROLOGICAL SURGERY

## 2024-11-14 PROCEDURE — 160041 HCHG SURGERY MINUTES - EA ADDL 1 MIN LEVEL 4: Performed by: NEUROLOGICAL SURGERY

## 2024-11-14 PROCEDURE — 700111 HCHG RX REV CODE 636 W/ 250 OVERRIDE (IP): Mod: JZ | Performed by: ANESTHESIOLOGY

## 2024-11-14 PROCEDURE — 700101 HCHG RX REV CODE 250: Performed by: NEUROLOGICAL SURGERY

## 2024-11-14 PROCEDURE — 700102 HCHG RX REV CODE 250 W/ 637 OVERRIDE(OP): Performed by: NURSE PRACTITIONER

## 2024-11-14 PROCEDURE — 160035 HCHG PACU - 1ST 60 MINS PHASE I: Performed by: NEUROLOGICAL SURGERY

## 2024-11-14 PROCEDURE — 160029 HCHG SURGERY MINUTES - 1ST 30 MINS LEVEL 4: Performed by: NEUROLOGICAL SURGERY

## 2024-11-14 PROCEDURE — 160048 HCHG OR STATISTICAL LEVEL 1-5: Performed by: NEUROLOGICAL SURGERY

## 2024-11-14 PROCEDURE — 700111 HCHG RX REV CODE 636 W/ 250 OVERRIDE (IP): Mod: JZ

## 2024-11-14 PROCEDURE — 160002 HCHG RECOVERY MINUTES (STAT): Performed by: NEUROLOGICAL SURGERY

## 2024-11-14 PROCEDURE — 700111 HCHG RX REV CODE 636 W/ 250 OVERRIDE (IP): Mod: JZ | Performed by: EMERGENCY MEDICINE

## 2024-11-14 PROCEDURE — 700105 HCHG RX REV CODE 258: Performed by: ANESTHESIOLOGY

## 2024-11-14 PROCEDURE — 110454 HCHG SHELL REV 250: Performed by: NEUROLOGICAL SURGERY

## 2024-11-14 PROCEDURE — 700101 HCHG RX REV CODE 250: Performed by: ANESTHESIOLOGY

## 2024-11-14 PROCEDURE — 160036 HCHG PACU - EA ADDL 30 MINS PHASE I: Performed by: NEUROLOGICAL SURGERY

## 2024-11-14 PROCEDURE — 700102 HCHG RX REV CODE 250 W/ 637 OVERRIDE(OP): Performed by: ANESTHESIOLOGY

## 2024-11-14 PROCEDURE — A9270 NON-COVERED ITEM OR SERVICE: HCPCS | Performed by: NURSE PRACTITIONER

## 2024-11-14 PROCEDURE — A9270 NON-COVERED ITEM OR SERVICE: HCPCS | Performed by: ANESTHESIOLOGY

## 2024-11-14 PROCEDURE — 160009 HCHG ANES TIME/MIN: Performed by: NEUROLOGICAL SURGERY

## 2024-11-14 PROCEDURE — 01NB0ZZ RELEASE LUMBAR NERVE, OPEN APPROACH: ICD-10-PCS | Performed by: NEUROLOGICAL SURGERY

## 2024-11-14 PROCEDURE — 85027 COMPLETE CBC AUTOMATED: CPT

## 2024-11-14 PROCEDURE — 0SB20ZZ EXCISION OF LUMBAR VERTEBRAL DISC, OPEN APPROACH: ICD-10-PCS | Performed by: NEUROLOGICAL SURGERY

## 2024-11-14 PROCEDURE — 700111 HCHG RX REV CODE 636 W/ 250 OVERRIDE (IP): Performed by: NEUROLOGICAL SURGERY

## 2024-11-14 PROCEDURE — 80048 BASIC METABOLIC PNL TOTAL CA: CPT

## 2024-11-14 PROCEDURE — 770001 HCHG ROOM/CARE - MED/SURG/GYN PRIV*

## 2024-11-14 PROCEDURE — 96375 TX/PRO/DX INJ NEW DRUG ADDON: CPT

## 2024-11-14 PROCEDURE — 84703 CHORIONIC GONADOTROPIN ASSAY: CPT

## 2024-11-14 PROCEDURE — 72020 X-RAY EXAM OF SPINE 1 VIEW: CPT

## 2024-11-14 RX ORDER — SODIUM PHOSPHATE,MONO-DIBASIC 19G-7G/118
1 ENEMA (ML) RECTAL
Status: DISCONTINUED | OUTPATIENT
Start: 2024-11-14 | End: 2024-11-14 | Stop reason: HOSPADM

## 2024-11-14 RX ORDER — KETAMINE HYDROCHLORIDE 50 MG/ML
INJECTION, SOLUTION, CONCENTRATE INTRAMUSCULAR; INTRAVENOUS PRN
Status: DISCONTINUED | OUTPATIENT
Start: 2024-11-14 | End: 2024-11-14 | Stop reason: SURG

## 2024-11-14 RX ORDER — OXYCODONE HCL 5 MG/5 ML
5 SOLUTION, ORAL ORAL
Status: DISCONTINUED | OUTPATIENT
Start: 2024-11-14 | End: 2024-11-14 | Stop reason: HOSPADM

## 2024-11-14 RX ORDER — BUPIVACAINE HYDROCHLORIDE AND EPINEPHRINE 5; 5 MG/ML; UG/ML
INJECTION, SOLUTION PERINEURAL
Status: DISCONTINUED | OUTPATIENT
Start: 2024-11-14 | End: 2024-11-14 | Stop reason: HOSPADM

## 2024-11-14 RX ORDER — CEFAZOLIN SODIUM 1 G/3ML
INJECTION, POWDER, FOR SOLUTION INTRAMUSCULAR; INTRAVENOUS
Status: DISCONTINUED | OUTPATIENT
Start: 2024-11-14 | End: 2024-11-14 | Stop reason: HOSPADM

## 2024-11-14 RX ORDER — HYDROMORPHONE HYDROCHLORIDE 2 MG/ML
INJECTION, SOLUTION INTRAMUSCULAR; INTRAVENOUS; SUBCUTANEOUS PRN
Status: DISCONTINUED | OUTPATIENT
Start: 2024-11-14 | End: 2024-11-14 | Stop reason: SURG

## 2024-11-14 RX ORDER — BISACODYL 10 MG
10 SUPPOSITORY, RECTAL RECTAL
Status: DISCONTINUED | OUTPATIENT
Start: 2024-11-14 | End: 2024-11-14 | Stop reason: HOSPADM

## 2024-11-14 RX ORDER — DEXAMETHASONE SODIUM PHOSPHATE 4 MG/ML
INJECTION, SOLUTION INTRA-ARTICULAR; INTRALESIONAL; INTRAMUSCULAR; INTRAVENOUS; SOFT TISSUE PRN
Status: DISCONTINUED | OUTPATIENT
Start: 2024-11-14 | End: 2024-11-14 | Stop reason: SURG

## 2024-11-14 RX ORDER — SCOLOPAMINE TRANSDERMAL SYSTEM 1 MG/1
PATCH, EXTENDED RELEASE TRANSDERMAL
Status: COMPLETED
Start: 2024-11-14 | End: 2024-11-14

## 2024-11-14 RX ORDER — CEFAZOLIN SODIUM 1 G/3ML
INJECTION, POWDER, FOR SOLUTION INTRAMUSCULAR; INTRAVENOUS PRN
Status: DISCONTINUED | OUTPATIENT
Start: 2024-11-14 | End: 2024-11-14 | Stop reason: SURG

## 2024-11-14 RX ORDER — HYDROMORPHONE HYDROCHLORIDE 1 MG/ML
0.2 INJECTION, SOLUTION INTRAMUSCULAR; INTRAVENOUS; SUBCUTANEOUS
Status: DISCONTINUED | OUTPATIENT
Start: 2024-11-14 | End: 2024-11-14 | Stop reason: HOSPADM

## 2024-11-14 RX ORDER — MIDAZOLAM HYDROCHLORIDE 1 MG/ML
INJECTION INTRAMUSCULAR; INTRAVENOUS PRN
Status: DISCONTINUED | OUTPATIENT
Start: 2024-11-14 | End: 2024-11-14 | Stop reason: SURG

## 2024-11-14 RX ORDER — DIPHENHYDRAMINE HYDROCHLORIDE 50 MG/ML
12.5 INJECTION INTRAMUSCULAR; INTRAVENOUS
Status: DISCONTINUED | OUTPATIENT
Start: 2024-11-14 | End: 2024-11-14 | Stop reason: HOSPADM

## 2024-11-14 RX ORDER — PROCHLORPERAZINE EDISYLATE 5 MG/ML
5-10 INJECTION INTRAMUSCULAR; INTRAVENOUS EVERY 4 HOURS PRN
Status: DISCONTINUED | OUTPATIENT
Start: 2024-11-14 | End: 2024-11-14 | Stop reason: HOSPADM

## 2024-11-14 RX ORDER — METHOCARBAMOL 750 MG/1
750 TABLET, FILM COATED ORAL EVERY 8 HOURS PRN
Status: DISCONTINUED | OUTPATIENT
Start: 2024-11-14 | End: 2024-11-14 | Stop reason: HOSPADM

## 2024-11-14 RX ORDER — LIDOCAINE HYDROCHLORIDE 20 MG/ML
INJECTION, SOLUTION EPIDURAL; INFILTRATION; INTRACAUDAL; PERINEURAL PRN
Status: DISCONTINUED | OUTPATIENT
Start: 2024-11-14 | End: 2024-11-14 | Stop reason: SURG

## 2024-11-14 RX ORDER — AMOXICILLIN 250 MG
1 CAPSULE ORAL
Status: DISCONTINUED | OUTPATIENT
Start: 2024-11-14 | End: 2024-11-14 | Stop reason: HOSPADM

## 2024-11-14 RX ORDER — ONDANSETRON 4 MG/1
4 TABLET, ORALLY DISINTEGRATING ORAL EVERY 4 HOURS PRN
Status: DISCONTINUED | OUTPATIENT
Start: 2024-11-14 | End: 2024-11-14 | Stop reason: HOSPADM

## 2024-11-14 RX ORDER — LIDOCAINE HYDROCHLORIDE 40 MG/ML
SOLUTION TOPICAL PRN
Status: DISCONTINUED | OUTPATIENT
Start: 2024-11-14 | End: 2024-11-14 | Stop reason: SURG

## 2024-11-14 RX ORDER — DEXAMETHASONE SODIUM PHOSPHATE 4 MG/ML
4 INJECTION, SOLUTION INTRA-ARTICULAR; INTRALESIONAL; INTRAMUSCULAR; INTRAVENOUS; SOFT TISSUE EVERY 4 HOURS
Status: DISCONTINUED | OUTPATIENT
Start: 2024-11-14 | End: 2024-11-14 | Stop reason: HOSPADM

## 2024-11-14 RX ORDER — KETOROLAC TROMETHAMINE 30 MG/ML
INJECTION, SOLUTION INTRAMUSCULAR; INTRAVENOUS PRN
Status: DISCONTINUED | OUTPATIENT
Start: 2024-11-14 | End: 2024-11-14 | Stop reason: SURG

## 2024-11-14 RX ORDER — METOCLOPRAMIDE HYDROCHLORIDE 5 MG/ML
INJECTION INTRAMUSCULAR; INTRAVENOUS PRN
Status: DISCONTINUED | OUTPATIENT
Start: 2024-11-14 | End: 2024-11-14 | Stop reason: SURG

## 2024-11-14 RX ORDER — CYCLOBENZAPRINE HCL 5 MG
10 TABLET ORAL EVERY 8 HOURS PRN
Status: DISCONTINUED | OUTPATIENT
Start: 2024-11-14 | End: 2024-11-14 | Stop reason: HOSPADM

## 2024-11-14 RX ORDER — PROMETHAZINE HYDROCHLORIDE 12.5 MG/1
12.5-25 SUPPOSITORY RECTAL EVERY 4 HOURS PRN
Status: DISCONTINUED | OUTPATIENT
Start: 2024-11-14 | End: 2024-11-14 | Stop reason: HOSPADM

## 2024-11-14 RX ORDER — AMOXICILLIN 250 MG
1 CAPSULE ORAL NIGHTLY
Status: DISCONTINUED | OUTPATIENT
Start: 2024-11-14 | End: 2024-11-14 | Stop reason: HOSPADM

## 2024-11-14 RX ORDER — DIAZEPAM 5 MG/1
5 TABLET ORAL EVERY 4 HOURS PRN
Status: DISCONTINUED | OUTPATIENT
Start: 2024-11-14 | End: 2024-11-14 | Stop reason: HOSPADM

## 2024-11-14 RX ORDER — POLYETHYLENE GLYCOL 3350 17 G/17G
1 POWDER, FOR SOLUTION ORAL 2 TIMES DAILY PRN
Status: DISCONTINUED | OUTPATIENT
Start: 2024-11-14 | End: 2024-11-14 | Stop reason: HOSPADM

## 2024-11-14 RX ORDER — ONDANSETRON 2 MG/ML
INJECTION INTRAMUSCULAR; INTRAVENOUS PRN
Status: DISCONTINUED | OUTPATIENT
Start: 2024-11-14 | End: 2024-11-14 | Stop reason: SURG

## 2024-11-14 RX ORDER — OXYCODONE HCL 5 MG/5 ML
10 SOLUTION, ORAL ORAL
Status: DISCONTINUED | OUTPATIENT
Start: 2024-11-14 | End: 2024-11-14 | Stop reason: HOSPADM

## 2024-11-14 RX ORDER — SCOLOPAMINE TRANSDERMAL SYSTEM 1 MG/1
PATCH, EXTENDED RELEASE TRANSDERMAL PRN
Status: DISCONTINUED | OUTPATIENT
Start: 2024-11-14 | End: 2024-11-14 | Stop reason: SURG

## 2024-11-14 RX ORDER — HYDROMORPHONE HYDROCHLORIDE 1 MG/ML
0.4 INJECTION, SOLUTION INTRAMUSCULAR; INTRAVENOUS; SUBCUTANEOUS
Status: DISCONTINUED | OUTPATIENT
Start: 2024-11-14 | End: 2024-11-14 | Stop reason: HOSPADM

## 2024-11-14 RX ORDER — HYDRALAZINE HYDROCHLORIDE 20 MG/ML
5 INJECTION INTRAMUSCULAR; INTRAVENOUS
Status: DISCONTINUED | OUTPATIENT
Start: 2024-11-14 | End: 2024-11-14 | Stop reason: HOSPADM

## 2024-11-14 RX ORDER — MEPERIDINE HYDROCHLORIDE 25 MG/ML
12.5 INJECTION INTRAMUSCULAR; INTRAVENOUS; SUBCUTANEOUS
Status: DISCONTINUED | OUTPATIENT
Start: 2024-11-14 | End: 2024-11-14 | Stop reason: HOSPADM

## 2024-11-14 RX ORDER — HYDROMORPHONE HYDROCHLORIDE 1 MG/ML
0.1 INJECTION, SOLUTION INTRAMUSCULAR; INTRAVENOUS; SUBCUTANEOUS
Status: DISCONTINUED | OUTPATIENT
Start: 2024-11-14 | End: 2024-11-14 | Stop reason: HOSPADM

## 2024-11-14 RX ORDER — PROMETHAZINE HYDROCHLORIDE 25 MG/1
12.5-25 TABLET ORAL EVERY 4 HOURS PRN
Status: DISCONTINUED | OUTPATIENT
Start: 2024-11-14 | End: 2024-11-14 | Stop reason: HOSPADM

## 2024-11-14 RX ORDER — ALBUTEROL SULFATE 5 MG/ML
2.5 SOLUTION RESPIRATORY (INHALATION)
Status: DISCONTINUED | OUTPATIENT
Start: 2024-11-14 | End: 2024-11-14 | Stop reason: HOSPADM

## 2024-11-14 RX ORDER — HALOPERIDOL 5 MG/ML
1 INJECTION INTRAMUSCULAR
Status: DISCONTINUED | OUTPATIENT
Start: 2024-11-14 | End: 2024-11-14 | Stop reason: HOSPADM

## 2024-11-14 RX ORDER — SODIUM CHLORIDE 9 MG/ML
INJECTION, SOLUTION INTRAVENOUS CONTINUOUS
Status: DISCONTINUED | OUTPATIENT
Start: 2024-11-14 | End: 2024-11-14 | Stop reason: HOSPADM

## 2024-11-14 RX ORDER — LABETALOL HYDROCHLORIDE 5 MG/ML
5 INJECTION, SOLUTION INTRAVENOUS
Status: DISCONTINUED | OUTPATIENT
Start: 2024-11-14 | End: 2024-11-14 | Stop reason: HOSPADM

## 2024-11-14 RX ORDER — DOCUSATE SODIUM 100 MG/1
100 CAPSULE, LIQUID FILLED ORAL 2 TIMES DAILY
Status: DISCONTINUED | OUTPATIENT
Start: 2024-11-14 | End: 2024-11-14 | Stop reason: HOSPADM

## 2024-11-14 RX ORDER — MAGNESIUM SULFATE HEPTAHYDRATE 40 MG/ML
INJECTION, SOLUTION INTRAVENOUS PRN
Status: DISCONTINUED | OUTPATIENT
Start: 2024-11-14 | End: 2024-11-14 | Stop reason: SURG

## 2024-11-14 RX ORDER — SODIUM CHLORIDE, SODIUM LACTATE, POTASSIUM CHLORIDE, CALCIUM CHLORIDE 600; 310; 30; 20 MG/100ML; MG/100ML; MG/100ML; MG/100ML
INJECTION, SOLUTION INTRAVENOUS
Status: DISCONTINUED | OUTPATIENT
Start: 2024-11-14 | End: 2024-11-14 | Stop reason: SURG

## 2024-11-14 RX ORDER — ONDANSETRON 2 MG/ML
4 INJECTION INTRAMUSCULAR; INTRAVENOUS EVERY 4 HOURS PRN
Status: DISCONTINUED | OUTPATIENT
Start: 2024-11-14 | End: 2024-11-14 | Stop reason: HOSPADM

## 2024-11-14 RX ADMIN — LIDOCAINE HYDROCHLORIDE 4 ML: 40 SOLUTION TOPICAL at 11:39

## 2024-11-14 RX ADMIN — HYDROMORPHONE HYDROCHLORIDE 0.4 MG: 2 INJECTION INTRAMUSCULAR; INTRAVENOUS; SUBCUTANEOUS at 12:42

## 2024-11-14 RX ADMIN — FENTANYL CITRATE 100 MCG: 50 INJECTION, SOLUTION INTRAMUSCULAR; INTRAVENOUS at 11:35

## 2024-11-14 RX ADMIN — HYDROMORPHONE HYDROCHLORIDE 0.4 MG: 2 INJECTION INTRAMUSCULAR; INTRAVENOUS; SUBCUTANEOUS at 12:12

## 2024-11-14 RX ADMIN — SCOPOLAMINE 1 PATCH: 1.5 PATCH, EXTENDED RELEASE TRANSDERMAL at 11:25

## 2024-11-14 RX ADMIN — ROCURONIUM BROMIDE 50 MG: 10 INJECTION, SOLUTION INTRAVENOUS at 11:38

## 2024-11-14 RX ADMIN — MAGNESIUM SULFATE HEPTAHYDRATE 2 G: 4 INJECTION, SOLUTION INTRAVENOUS at 11:54

## 2024-11-14 RX ADMIN — ONDANSETRON 8 MG: 2 INJECTION INTRAMUSCULAR; INTRAVENOUS at 12:30

## 2024-11-14 RX ADMIN — PROPOFOL 200 MG: 10 INJECTION, EMULSION INTRAVENOUS at 11:38

## 2024-11-14 RX ADMIN — OXYCODONE AND ACETAMINOPHEN 1 TABLET: 10; 325 TABLET ORAL at 14:33

## 2024-11-14 RX ADMIN — LIDOCAINE HYDROCHLORIDE 100 MG: 20 INJECTION, SOLUTION EPIDURAL; INFILTRATION; INTRACAUDAL; PERINEURAL at 11:38

## 2024-11-14 RX ADMIN — SODIUM CHLORIDE, POTASSIUM CHLORIDE, SODIUM LACTATE AND CALCIUM CHLORIDE: 600; 310; 30; 20 INJECTION, SOLUTION INTRAVENOUS at 11:35

## 2024-11-14 RX ADMIN — FENTANYL CITRATE 50 MCG: 50 INJECTION, SOLUTION INTRAMUSCULAR; INTRAVENOUS at 11:59

## 2024-11-14 RX ADMIN — HYDROMORPHONE HYDROCHLORIDE 1 MG: 1 INJECTION, SOLUTION INTRAMUSCULAR; INTRAVENOUS; SUBCUTANEOUS at 06:25

## 2024-11-14 RX ADMIN — KETOROLAC TROMETHAMINE 30 MG: 30 INJECTION, SOLUTION INTRAMUSCULAR; INTRAVENOUS at 12:30

## 2024-11-14 RX ADMIN — MIDAZOLAM HYDROCHLORIDE 2 MG: 1 INJECTION, SOLUTION INTRAMUSCULAR; INTRAVENOUS at 11:35

## 2024-11-14 RX ADMIN — CEFAZOLIN 2 G: 1 INJECTION, POWDER, FOR SOLUTION INTRAMUSCULAR; INTRAVENOUS at 11:38

## 2024-11-14 RX ADMIN — HYDROMORPHONE HYDROCHLORIDE 0.4 MG: 2 INJECTION INTRAMUSCULAR; INTRAVENOUS; SUBCUTANEOUS at 12:30

## 2024-11-14 RX ADMIN — HYDROMORPHONE HYDROCHLORIDE 0.8 MG: 2 INJECTION INTRAMUSCULAR; INTRAVENOUS; SUBCUTANEOUS at 12:49

## 2024-11-14 RX ADMIN — ONDANSETRON 4 MG: 2 INJECTION INTRAMUSCULAR; INTRAVENOUS at 03:24

## 2024-11-14 RX ADMIN — DEXAMETHASONE SODIUM PHOSPHATE 8 MG: 4 INJECTION INTRA-ARTICULAR; INTRALESIONAL; INTRAMUSCULAR; INTRAVENOUS; SOFT TISSUE at 11:38

## 2024-11-14 RX ADMIN — OXYCODONE AND ACETAMINOPHEN 1 TABLET: 10; 325 TABLET ORAL at 08:10

## 2024-11-14 RX ADMIN — FENTANYL CITRATE 100 MCG: 50 INJECTION, SOLUTION INTRAMUSCULAR; INTRAVENOUS at 11:54

## 2024-11-14 RX ADMIN — SUGAMMADEX 200 MG: 100 INJECTION, SOLUTION INTRAVENOUS at 12:46

## 2024-11-14 RX ADMIN — Medication 50 MG: at 11:59

## 2024-11-14 RX ADMIN — OXYCODONE AND ACETAMINOPHEN 1 TABLET: 10; 325 TABLET ORAL at 03:24

## 2024-11-14 RX ADMIN — METOCLOPRAMIDE HYDROCHLORIDE 10 MG: 5 INJECTION INTRAMUSCULAR; INTRAVENOUS at 12:31

## 2024-11-14 RX ADMIN — DEXAMETHASONE SODIUM PHOSPHATE 4 MG: 4 INJECTION INTRA-ARTICULAR; INTRALESIONAL; INTRAMUSCULAR; INTRAVENOUS; SOFT TISSUE at 03:24

## 2024-11-14 ASSESSMENT — PAIN DESCRIPTION - PAIN TYPE
TYPE: ACUTE PAIN
TYPE: SURGICAL PAIN
TYPE: ACUTE PAIN
TYPE: SURGICAL PAIN
TYPE: ACUTE PAIN

## 2024-11-14 NOTE — PROGRESS NOTES
Patient admitted thru ER with severe, refractory pain due to large Lt L4-5 disc and Lt L5 radiculopathy.  Patient added to my OR schedule 11/14 for Lt L4-5 laminotomy/discectomy.

## 2024-11-14 NOTE — CARE PLAN
The patient is Stable - Low risk of patient condition declining or worsening    Shift Goals  Clinical Goals: Pain control, laminectomy on 11/1424  Patient Goals: Rest, pan control    Progress made toward(s) clinical / shift goals:      Problem: Pain - Standard  Goal: Alleviation of pain or a reduction in pain to the patient’s comfort goal  Description: Target End Date:  Prior to discharge or change in level of care    Document on Vitals flowsheet    1.  Document pain using the appropriate pain scale per order or unit policy  2.  Educate and implement non-pharmacologic comfort measures (i.e. relaxation, distraction, massage, cold/heat therapy, etc.)  3.  Pain management medications as ordered  4.  Reassess pain after pain med administration per policy  5.  If opiods administered assess patient's response to pain medication is appropriate per POSS sedation scale  6.  Follow pain management plan developed in collaboration with patient and interdisciplinary team   Outcome: Progressing, reports PO narcotic medication effective for pain management at this time. Declines IV pain medications.      Problem: Knowledge Deficit - Standard  Goal: Patient and family/care givers will demonstrate understanding of plan of care, disease process/condition, diagnostic tests and medications  Description: Target End Date:  1-3 days or as soon as patient condition allows    Document in Patient Education    1.  Patient and family/caregiver oriented to unit, equipment, visitation policy and means for communicating concern  2.  Complete/review Learning Assessment  3.  Assess knowledge level of disease process/condition, treatment plan, diagnostic tests and medications  4.  Explain disease process/condition, treatment plan, diagnostic tests and medications  Outcome: Progressing, verbalized understanding of education provided and POC.      Problem: Neurovascular Monitoring  Goal: Patient's neurovascular status will be maintained or  improve  Description: Target End Date:  Prior to discharge or change in level of care    Document on Assessment flowsheet    1.  Perform neurovascular assessment per order or unit policy. Assessment to include pulses, capillary refill, skin color, temperature, sensation, motor function, pain and edema.  2.  Explain to patient/family/caregiver the importance of neurovascular assessments and why it must be performed.  3.  Prepare the patient for frequent assessments, if necessary  11/13/2024 7325 by Sara Barajas R.N.  Outcome: Progressing, numbness/tingling to LLE calf, no additional neuro decline noted.

## 2024-11-14 NOTE — ED NOTES
Hospital bed ordered to promote comfort for pt. Pt updated on POC. Pt reports pain well managed at this time

## 2024-11-14 NOTE — ANESTHESIA PREPROCEDURE EVALUATION
Case: [id number] Anesthesia Start Date/Time: 11/14/24 1135    Procedure: LAMINECTOMY, SPINE, LUMBAR, WITH DISCECTOMY L4-L5 (Left)    Anesthesia type: general    Location: [facility] OR  / SURGERY [facility]    Surgeons: Derrick Berry M.D.          Morbid obesity  Spinal stenosis with severe pain  PONV    Relevant Problems   Other   (positive) Anxiety       Physical Exam    Airway   Mallampati: II  TM distance: >3 FB  Neck ROM: full       Cardiovascular - normal exam  Rhythm: regular  Rate: normal  (-) murmur     Dental - normal exam           Pulmonary - normal exam  Breath sounds clear to auscultation     Abdominal    Neurological - normal exam                   Anesthesia Plan    ASA 3   ASA physical status 3 criteria: morbid obesity - BMI greater than or equal to 40    Plan - general       Airway plan will be ETT          Induction: intravenous    Postoperative Plan: Postoperative administration of opioids is intended.    Pertinent diagnostic labs and testing reviewed    Informed Consent:    Anesthetic plan and risks discussed with patient.

## 2024-11-14 NOTE — PROGRESS NOTES
Patient arrived to the floor at this time from PACU. A+Ox4, on room air satting 89%, placed on 1L, satting 95%. C/O 7/10 pain to lower back, medicated per EMAR. Ambulated to BR and voided. Surgical site C/D/I, hemovac in place with scant sanguinous drainage. Post op orders reviewed and released, patient educated to call for assist when getting OOB. Call bell in hand

## 2024-11-14 NOTE — ED NOTES
Pt placed in hospital bed and updated on POC    Pt reports nausea and episode of vomiting when trying to eat. ERP contacted and pt medicated per MAR

## 2024-11-14 NOTE — OP REPORT
DATE OF SERVICE:  11/14/2024     PREOPERATIVE DIAGNOSIS:  Severe left L5 radicular pain secondary to large left   L4-5 HNP.     POSTOPERATIVE DIAGNOSIS:  Severe left L5 radicular pain secondary to large   left L4-5 HNP.     OPERATIONS:  Microscopic left 4-5 laminotomy, medial facetectomy, excision of   herniated disk, decompression of left L5 nerve root.     SURGEON:  Derrick Berry MD     ASSISTANT:  LUKE Pathak     ANESTHESIA:  General endotracheal.     ANESTHESIOLOGIST:  Vitor Milton MD     PREPARATION:  ChloraPrep.     MEDICATIONS:  The patient given Ancef prior to incision.     INDICATIONS:  This woman with a large disk.  She has been dealing with   radicular pain for close to a year. In spite of months of therapy, epidurals   pain persisted.  We repeated an MRI recently, which showed no change in the   disk compared to the MRI done in February.  Because of severe pain, the   patient was admitted through the ER last night started on parenteral   medication and added to my surgery schedule today to get her out of the   hospital.  She understood major risks and complications, paralysis exceedingly   rare, biggest risk of surgery is nonresponse, which is 5-10%, for which   further surgery may or may not be indicated, small risk of wound infection,   spinal fluid leak, chance of recurrent disk herniation, need for further   surgery the rest of her life roughly 15%.  The patient has neurologic deficit,   goals to keep that from getting worse and optimize the potential for the   recovery of loss function.  Though, she has got a year to regain loss of   function, any deficit present in a year or most likely will persist.  The   patient is understanding and agreed to proceed and signed consent.     NEED FOR SURGICAL ASSISTANCE:  Surgical assistance required under both loupe   and microscopic magnification for retraction, suction, irrigation, cleaning of   instruments, keep the case moving forward, primarily  for retraction of the   thecal sac during the diskectomy.     DESCRIPTION OF PROCEDURE:  The patient was brought to the operating room.    Peripheral venous lines in place.  General anesthesia was induced.  The   patient intubated.  No Mccall catheter was placed.  The patient laid prone on   the OSI table using the 6 bolsters.  Pressure points carefully padded.  The   back was doubly prepped with ChloraPrep by myself and draped.  Planned   incision was marked in midline of spinous processes 4 and 5.  Skin was   infiltrated with local and incised with scalpel using electrocautery   dissection deep in the midline down to and through deep fashion using a   subperiosteal dissection.  Paravertebral muscles dissected off spinous process   lamina 4 and 5 on the left.  We had used the deep retractor system because of   the patient's body habitus, levels confirmed with intraoperative fluoroscopy.    Next, using Midas Nathan drill AM-8 bit, inferior portion of lamina 4, the   superior portion of lamina 5, the medial facet was drilled down to paper thin   shelf of bone.  This thin shelf of bone plus ligamentum flavum removed in   piecemeal fashion with a 2 and 3 mm Kerrisons.  I worked laterally to the   medial aspect of 5 pedicle, superiorly to insertion of ligamentum flavum. A 5   root was identified.  Next, operating microscope was draped, brought in under   high power magnification using microtechnique to minimize risk of neural   injury.  The 5 root was carefully dissected over the herniated disk.  The   posterior longitudinal ligament was opened with a 11 blade and then using a   small blunt nerve hooks, disk material was teased out from underneath the   ligament in several large pieces.  I could palpate the annular defect   medially. We placed disk space irrigation, no further nuclear material was   flushed out. The 5 root was completely decompressed.  I could slide the nerve   probe along the canal, along the lateral  recess around the 5 pedicle without   compromise.  Throughout bone bleeding controlled with bone wax, minor epidural   bleeding controlled with bipolar electrocautery and Gelfoam powder mixed with   thrombin.  Muscle bleeders controlled with bipolar electrocautery.  Wound was   irrigated with antibiotic irrigation.  A medium Hemovac drain was placed in   depth of the wound, brought out through separate stab incision.  Deep fascia   was closed with 0 Vicryl, subcutaneous fascia with 0 Vicryl, subcuticular   layer closed with 3-0 Vicryl, and skin edges approximated with quarter-inch   Steri-Strips.  Drain was sutured in place at the exit site with 0 Vicryl   connected to closed drainage system.  Sterile dressings were placed.  The   patient laid supine on the bed, extubated and taken to recovery room in   satisfactory condition.  The patient tolerated the procedure well without   apparent complication.  Estimated blood loss 50 mL.        ______________________________  MD EV BARRERA/DIANNE    DD:  11/14/2024 13:06  DT:  11/14/2024 13:36    Job#:  [id number]

## 2024-11-14 NOTE — OR NURSING
1253  Patient arrived from OR, maintaining own airway. Received report from Dr. Milton and Ivan HERNANDEZ RN, assumed care. VSS. Surgical dressing mid back, CDI. Hemovac in place. No s/s of pain or nausea noted. Orders reviewed and implemented.    1256 Patient rouses to voice.   1302 Oriented x 4, denies pain or nausea. Neuro intact.   1308 Dr. Berry bedside to see patient and discuss procedure/plan.   1320 Declines water.   1336  updated.    1350 Tolerating ice chips.   1355 Patient resting comfortably, continues to deny pain or nausea. VSS. Surgical dressing remains CDI.    1400 Report to Shandra XAVIER RN.     1410 Patient discharged back to room with ring in place and earrings in container on bed IV pole.   1413 Arrived to room, Shandra MALHOTRA present to assume care.

## 2024-11-14 NOTE — PROGRESS NOTES
Bedside report received from Sara MALHOTRA at this time. Patient A+Ox4, on room air, VSS. Medicated for pain per EMAR. Ambulating to BR with standby assist, steady gait. NPO for surgery today. Using call bell approp

## 2024-11-14 NOTE — PROGRESS NOTES
Report received from ROSCOE Chan. Pt arrived to unit via gurney at 2120. A&Ox4, oriented to room, unit, and plan of care. Reports numbness/tingling to LLE calf. Scheduled for laminectomy on 11/14/24. All questions answered at this time. Reports pain managed with oral medications. Call light within reach; fall precautions in place.

## 2024-11-14 NOTE — PROGRESS NOTES
2 RN Skin Assessment Completed by ROSCOE Ruiz and MARY LOU Calles.     Head: WDL  Ears: WDL  Nose: WDL  Mouth: WDL  Neck: WDL  Breasts/Chest: WDL  Shoulder Blades: WDL  Spine: WDL  (R) Arm/Elbow/hand: WDL  (L) Arm/Elbow/hand: WDL  Abdomen:WDL  Groin: WDL  Sacrum/Coccyx/Buttocks: blanching  (R) Leg: WDL  (L) Leg: WDL  (R) Heel/Foot/Toe: blanching  (L) Heel/Foot/Toe: blanching         No skin issues identified.     Devices in place: BP Cuff, Pulse Ox     Interventions in place: Pillows, pt turns self appropriately.      Possible skin injury found: No     Pictures uploaded into Epic: N/A  Wound Consult Placed: N/A

## 2024-11-14 NOTE — ANESTHESIA PROCEDURE NOTES
Airway    Date/Time: 11/14/2024 11:39 AM    Performed by: Vitor Milton M.D.  Authorized by: Vitor Milton M.D.    Location:  OR  Urgency:  Elective  Difficult Airway: No    Indications for Airway Management:  Anesthesia      Spontaneous Ventilation: absent    Sedation Level:  Deep  Preoxygenated: Yes    Patient Position:  Sniffing  Final Airway Type:  Endotracheal airway  Final Endotracheal Airway:  ETT  Cuffed: Yes    Technique Used for Successful ETT Placement:  Direct laryngoscopy  Devices/Methods Used in Placement:  Intubating stylet    Insertion Site:  Oral  Blade Type:  Don  Laryngoscope Blade/Videolaryngoscope Blade Size:  4  ETT Size (mm):  7.5  Measured from:  Teeth  ETT to Teeth (cm):  21  Placement Verified by: auscultation and capnometry    Cormack-Lehane Classification:  Grade I - full view of glottis  Number of Attempts at Approach:  1

## 2024-11-14 NOTE — ANESTHESIA TIME REPORT
Anesthesia Start and Stop Event Times       Date Time Event    11/14/2024 1102 Ready for Procedure     1135 Anesthesia Start     1256 Anesthesia Stop          Responsible Staff  11/14/24      Name Role Begin End    Vtior Milton M.D. Anesth 1135 1256          Overtime Reason:  no overtime (within assigned shift)    Comments:

## 2024-11-15 NOTE — ANESTHESIA POSTPROCEDURE EVALUATION
Patient: Randi Farmer    Procedure Summary       Date: 11/14/24 Room / Location: [facility] OR 06 / SURGERY [facility]    Anesthesia Start: 1135 Anesthesia Stop: 1256    Procedure: LAMINECTOMY, SPINE, LUMBAR, WITH DISCECTOMY L4-L5 (Left: Spine Lumbar) Diagnosis: (large Lt L4-5 disc and Lt L5 radiculopathy)    Surgeons: Derrick Berry M.D. Responsible Provider: Vitor Milton M.D.    Anesthesia Type: general ASA Status: 3            Final Anesthesia Type: general  Last vitals  BP   Blood Pressure: 125/73    Temp   36.1 °C (97 °F)    Pulse   (!) 103   Resp   16    SpO2   90 %      Anesthesia Post Evaluation    No notable events documented.     Nurse Pain Score: 2 (NPRS)

## 2024-11-15 NOTE — PROGRESS NOTES
DISCHARGE NOTE    Patient cleared for discharge home no services. Soniya VALERIO placed DC order and states patient already has follow up appointment with office. Her pain medication has been faxed to [facility] pharmacy in [place], patient got text notification that it is ready for pickup. Home medications returned to patient. IV removed, hemovac removed. Patients  is here to drive her home. Leaving at this time via wheelchair with all personal belongings

## 2024-11-16 NOTE — DISCHARGE SUMMARY
DATE OF ADMISSION:  11/13/2024    DATE OF DISCHARGE:  11/14/2024    HOSPITAL COURSE:  On date of admit, the patient presented to the ER for severe   refractory pain due to large herniated disk.  She was seen in the ER and   evaluated.  Ketamine and IV Dilaudid were given.  She was admitted to the   hospital with IV Decadron and for pain control and added to the OR schedule   the following day.  On 11/14/2024, a left L4-L5 diskectomy was completed.    Postop, the patient did well.  She did state complete resolution of her severe   leg pain.  She was eating, ambulating and voiding and was discharged home in   stable condition.     DISCHARGE INSTRUCTIONS:  Given to the patient in paper format.     DISCHARGE MEDICATIONS:  Percocet 5/325 one to two p.o. q. 6 hours p.r.n. pain,   #56, no refills.     Informed consent and medication agreement done over the phone as well as in   person at Dr. Berry's visit.     ASSESSMENT AND PLAN:    1.  Status post above delineated surgery with Dr. Berry on 11/14/2024.  2.  The patient will follow up in our office in 4 weeks.  3.  The patient will avoid all NSAIDs for 1 week.     Should the patient have further questions or concerns, they will not hesitate   to give our office a call.          ___________________________________  LUKE Pathak      ___________________________________  SRINIVASA BERRY MD      DD: 11/15/2024 10:29:36  DT: 11/15/2024 19:15:48  Job#: [id number]

## 2024-11-18 NOTE — H&P
DATE OF ADMISSION:  11/13/2024     HISTORY OF PRESENT ILLNESS:  A 29-year-old female who has been dealing with a   left L5 radicular syndrome for close to a year, she has been through physical   therapy plus epidurals.  When I saw her, I prescribed some Percocet for her.    She called up with severe pain.  We had to go to the emergency room to be   admitted to be started on parenteral medication, namely IV Decadron padded.  I   added the patient to my schedule that following day for surgery.     PAST MEDICAL HISTORY:  Positive for remote history of meningitis, history of   anxiety and depression.     CURRENT MEDICATIONS:  Sertraline.     ALLERGIES:  VICODIN.     REVIEW OF SYSTEMS:  No history of bleeding disorder or DVT.     PHYSICAL EXAMINATION:    HEENT:  Unremarkable.  LUNGS:  Clear.  HEART:  Regular rate and rhythm.  ABDOMEN:  Benign.  EXTREMITIES:  No deformities.  NEUROLOGIC:  She has got dorsiflexion weakness.  She cannot heel walk on the   left and she can on the right.     MRI shows a large disk herniation.  We repeated her MRI as her old one was   done in February.  The new MRI shows no change in the large disk herniation   compressing the left L5 nerve root.     IMPRESSION:  Severe left L5 radiculopathy with neurologic deficit, refractory   to nonoperative therapies.  The patient is being admitted for pain control.     PLAN:  The patient will have surgery the following day left L4-L5 laminotomy,   medial facetectomy, excision of herniated disk for decompression of the left 5   root, goals to improve her pain.  I told we accomplish that goal 90-95% of   time with minimal risk of subsequent surgery rate of 15%.  The patient is   understanding and wanted to proceed.        ______________________________  MD EV BARRERA/JIMI    DD:  11/18/2024 10:53  DT:  11/18/2024 14:12    Job#:  [id number]

## 2024-11-22 ENCOUNTER — APPOINTMENT (OUTPATIENT)
Dept: MEDICAL GROUP | Facility: PHYSICIAN GROUP | Age: 29
End: 2024-11-22
Payer: COMMERCIAL

## 2024-11-22 VITALS
SYSTOLIC BLOOD PRESSURE: 122 MMHG | DIASTOLIC BLOOD PRESSURE: 70 MMHG | WEIGHT: 210.2 LBS | HEART RATE: 80 BPM | HEIGHT: 60 IN | RESPIRATION RATE: 16 BRPM | TEMPERATURE: 97.6 F | OXYGEN SATURATION: 97 % | BODY MASS INDEX: 41.27 KG/M2

## 2024-11-22 DIAGNOSIS — Z00.00 WELLNESS EXAMINATION: ICD-10-CM

## 2024-11-22 DIAGNOSIS — E78.00 ELEVATED LDL CHOLESTEROL LEVEL: ICD-10-CM

## 2024-11-22 DIAGNOSIS — F41.9 ANXIETY: ICD-10-CM

## 2024-11-22 DIAGNOSIS — Z23 NEED FOR VACCINATION: ICD-10-CM

## 2024-11-22 DIAGNOSIS — E55.9 VITAMIN D DEFICIENCY: ICD-10-CM

## 2024-11-22 DIAGNOSIS — F32.A DEPRESSION, UNSPECIFIED DEPRESSION TYPE: ICD-10-CM

## 2024-11-22 DIAGNOSIS — R63.5 WEIGHT GAIN: ICD-10-CM

## 2024-11-22 DIAGNOSIS — Z30.9 ENCOUNTER FOR CONTRACEPTIVE MANAGEMENT, UNSPECIFIED TYPE: ICD-10-CM

## 2024-11-22 PROCEDURE — 3078F DIAST BP <80 MM HG: CPT | Performed by: FAMILY MEDICINE

## 2024-11-22 PROCEDURE — 90656 IIV3 VACC NO PRSV 0.5 ML IM: CPT | Performed by: FAMILY MEDICINE

## 2024-11-22 PROCEDURE — 99213 OFFICE O/P EST LOW 20 MIN: CPT | Mod: 25 | Performed by: FAMILY MEDICINE

## 2024-11-22 PROCEDURE — 3074F SYST BP LT 130 MM HG: CPT | Performed by: FAMILY MEDICINE

## 2024-11-22 PROCEDURE — 90471 IMMUNIZATION ADMIN: CPT | Performed by: FAMILY MEDICINE

## 2024-11-22 RX ORDER — OXYCODONE AND ACETAMINOPHEN 5; 325 MG/1; MG/1
TABLET ORAL
COMMUNITY
Start: 2024-11-06

## 2024-11-22 ASSESSMENT — FIBROSIS 4 INDEX: FIB4 SCORE: 0.38

## 2024-11-22 NOTE — PROGRESS NOTES
Subjective:     CC:   Chief Complaint   Patient presents with    Follow-Up       HPI:   Randi presents today for follow-up.  Patient did have her low back surgery recently and is feeling wonderful.  Patient feels she is pain-free at this time.  Patient does have follow-up appointments with her surgeon and also has physical therapy appointments in the future.    Past Medical History:   Diagnosis Date    Anxiety     Depression     Nausea & vomiting 11/17/2021       Social History     Tobacco Use    Smoking status: Never    Smokeless tobacco: Never   Vaping Use    Vaping status: Never Used   Substance Use Topics    Alcohol use: Not Currently     Comment: Occ    Drug use: Yes     Types: Marijuana, Oral     Comment: THC Edibles occ, CBD gummies 1x weekly       Current Outpatient Medications Ordered in Epic   Medication Sig Dispense Refill    oxyCODONE-acetaminophen (PERCOCET) 5-325 MG Tab       sertraline (ZOLOFT) 50 MG Tab TAKE ONE AND ONE-HALF TABLETS BY MOUTH DAILY.  Please keep your appointment as planned in November 135 Tablet 0     No current Epic-ordered facility-administered medications on file.       Allergies:  Hydrocodone and Penicillins    Health Maintenance: Completed    ROS:  Gen: no fevers/chills, no changes in weight  Eyes: no changes in vision  ENT: no sore throat, no hearing loss, no bloody nose  Pulm: no sob, no cough  CV: no chest pain, no palpitations  GI: no nausea/vomiting, no diarrhea  : no dysuria  Neuro: no headaches, no numbness/tingling  Heme/Lymph: no easy bruising    Objective:     Exam:  /70 (BP Location: Left arm, Patient Position: Sitting, BP Cuff Size: Adult)   Pulse 80   Temp 36.4 °C (97.6 °F) (Temporal)   Resp 16   Ht 1.524 m (5')   Wt 95.3 kg (210 lb 3.2 oz)   SpO2 97%   BMI 41.05 kg/m²  Body mass index is 41.05 kg/m².    Gen: Alert and oriented, No apparent distress.  Skin: Warm and dry.  No obvious lesions.  Incision from her back surgery looks like it is healing  well there is no signs of any drainage Steri-Strips are still in place  Eyes: Sclera wnl Pupils normal in size  Lungs: Normal effort, CTA bilaterally, no wheezes, rhonchi, or rales  CV: Regular rate and rhythm. No murmurs, rubs, or gallops.  Musculoskeletal: Normal gait. No extremity cyanosis, clubbing, or edema.  Neuro: Oriented to person, place and time  Psych: Mood is wnl       Assessment & Plan:     29 y.o. female with the following -     1. Vitamin D deficiency  Recommend checking her vitamin D patient to see me back in April timeframe    2. Weight gain  Will check her TSH.    3. Anxiety  Patient is doing well on the sertraline would recommend decreasing it to 1 tablet versus a tablet and a half patient can wait 2 weeks if it is working well for her she can continue with 1 tablet until I see her.    4. Need for vaccination  - INFLUENZA VACCINE TRI INJ (PF)     5. Encounter for contraceptive management, unspecified type  Patient did see her GYN provider and was informed that the Mirena is now good for 8 years.  So at this time she does not need it replaced    Return in about 5 months (around 4/22/2025), or if symptoms worsen or fail to improve.    Please note that this dictation was created using voice recognition software. I have made every reasonable attempt to correct obvious errors, but I expect that there are errors of grammar and possibly content that I did not discover before finalizing the note.

## 2025-04-15 ENCOUNTER — OFFICE VISIT (OUTPATIENT)
Dept: MEDICAL GROUP | Facility: PHYSICIAN GROUP | Age: 30
End: 2025-04-15
Payer: COMMERCIAL

## 2025-04-15 VITALS
HEIGHT: 60 IN | TEMPERATURE: 97.3 F | BODY MASS INDEX: 43.04 KG/M2 | DIASTOLIC BLOOD PRESSURE: 68 MMHG | OXYGEN SATURATION: 96 % | SYSTOLIC BLOOD PRESSURE: 118 MMHG | RESPIRATION RATE: 18 BRPM | HEART RATE: 78 BPM | WEIGHT: 219.2 LBS

## 2025-04-15 DIAGNOSIS — J02.9 ACUTE SORE THROAT: ICD-10-CM

## 2025-04-15 DIAGNOSIS — J35.8 TONSILLAR CALCULUS: ICD-10-CM

## 2025-04-15 LAB — S PYO DNA SPEC NAA+PROBE: NOT DETECTED

## 2025-04-15 PROCEDURE — 99214 OFFICE O/P EST MOD 30 MIN: CPT | Performed by: FAMILY MEDICINE

## 2025-04-15 PROCEDURE — 87651 STREP A DNA AMP PROBE: CPT | Performed by: FAMILY MEDICINE

## 2025-04-15 PROCEDURE — 3078F DIAST BP <80 MM HG: CPT | Performed by: FAMILY MEDICINE

## 2025-04-15 PROCEDURE — 3074F SYST BP LT 130 MM HG: CPT | Performed by: FAMILY MEDICINE

## 2025-04-15 RX ORDER — AZITHROMYCIN 250 MG/1
TABLET, FILM COATED ORAL
Qty: 6 TABLET | Refills: 0 | Status: SHIPPED | OUTPATIENT
Start: 2025-04-15

## 2025-04-15 ASSESSMENT — FIBROSIS 4 INDEX: FIB4 SCORE: 0.39

## 2025-04-15 ASSESSMENT — PATIENT HEALTH QUESTIONNAIRE - PHQ9: CLINICAL INTERPRETATION OF PHQ2 SCORE: 0

## 2025-04-15 NOTE — PROGRESS NOTES
Subjective:     CC:   Chief Complaint   Patient presents with    Follow-Up       HPI:   Randi presents today due to the fact that she has a history of tonsillar stones she was trying to do 1 out and she has a little bit of bleeding patient is been blowing and coughing up some yellow to green material and has a sore throat.  Patient denies any fever with this she has been sick for the last 2 to 3 days.    Past Medical History:   Diagnosis Date    Anxiety     Depression     Nausea & vomiting 11/17/2021       Social History     Tobacco Use    Smoking status: Never    Smokeless tobacco: Never   Vaping Use    Vaping status: Never Used   Substance Use Topics    Alcohol use: Not Currently     Comment: Occ    Drug use: Yes     Types: Marijuana, Oral     Comment: THC Edibles occ, CBD gummies 1x weekly       Current Outpatient Medications Ordered in Epic   Medication Sig Dispense Refill    azithromycin (ZITHROMAX) 250 MG Tab 2 tablets today then  1 tablet each day for the next 4 days 6 Tablet 0    oxyCODONE-acetaminophen (PERCOCET) 5-325 MG Tab       sertraline (ZOLOFT) 50 MG Tab TAKE ONE AND ONE-HALF TABLETS BY MOUTH DAILY.  Please keep your appointment as planned in November 135 Tablet 1     No current Epic-ordered facility-administered medications on file.       Allergies:  Hydrocodone and Penicillins    Health Maintenance: Completed    ROS:  Gen: no fevers/chills, has gained 8 pounds in 5 months  Eyes: no changes in vision  ENT: no sore throat, no hearing loss, no bloody nose  Pulm: no sob, no cough  CV: no chest pain, no palpitations  GI: no nausea/vomiting, no diarrhea  : no dysuria  Neuro: no headaches, no numbness/tingling  Heme/Lymph: no easy bruising    Objective:     Exam:  /68 (BP Location: Left arm, Patient Position: Sitting, BP Cuff Size: Adult)   Pulse 78   Temp 36.3 °C (97.3 °F)   Resp 18   Ht 1.524 m (5')   Wt 99.4 kg (219 lb 3.2 oz)   SpO2 96%   BMI 42.81 kg/m²  Body mass index is 42.81  kg/m².    Gen: Alert and oriented, No apparent distress.  Skin: Warm and dry.  No obvious lesions.  Eyes: Sclera wnl Pupils normal in size  ENT: Canals wnl and TM are not red, on examination of mouth there is a little bit of white exudates noted on both tonsillar area strep test was done.  Strep screen was negative  Neck: Neck is supple without lymphadenopathy.  Lungs: Normal effort, CTA bilaterally, no wheezes, rhonchi, or rales  CV: Regular rate and rhythm. No murmurs, rubs, or gallops.  Musculoskeletal: Normal gait. No extremity cyanosis, clubbing, or edema.  Neuro: Oriented to person, place and time  Psych: Mood is wnl       Assessment & Plan:     30 y.o. female with the following -     1. Acute sore throat  Patient admits to blowing some yellow-green material from her nose I believe this is more sinus related she had some fluid behind her TMs that is clear we will start her on Zithromax and also recommend either Flonase or Nasacort spray to start using.  - POCT GROUP A STREP, PCR    2. Tonsillar calculus  Will write referral to ear nose and throat patient would like to see them I did inform patient may take a little while to get in    Other orders  - azithromycin (ZITHROMAX) 250 MG Tab; 2 tablets today then  1 tablet each day for the next 4 days  Dispense: 6 Tablet; Refill: 0       Return in about 3 weeks (around 5/6/2025), or if symptoms worsen or fail to improve.  I did preordered some lab work for patient I would like to see her back to go with her lab results in 3 weeks  Please note that this dictation was created using voice recognition software. I have made every reasonable attempt to correct obvious errors, but I expect that there are errors of grammar and possibly content that I did not discover before finalizing the note.

## 2025-04-22 NOTE — Clinical Note
REFERRAL APPROVAL NOTICE         Sent on April 22, 2025                   Randi Farmer  [address] [place] [address]                   Dear Ms. Farmer,    After a careful review of the medical information and benefit coverage, [facility] has processed your referral. See below for additional details.    If applicable, you must be actively enrolled with your insurance for coverage of the authorized service. If you have any questions regarding your coverage, please contact your insurance directly.    REFERRAL INFORMATION   Referral #:  [id number]  Referred-To Provider    Referred-By Provider:  Otolaryngology    Xi Schwarz M.D.   [facility]      [address]  [place] [id number] [phone] [address]  [facility] [address]  [phone]    Referral Start Date:  04/15/2025  Referral End Date:   04/15/2026             SCHEDULING  If you do not already have an appointment, please call [phone] to make an appointment.     MORE INFORMATION  If you do not already have a [employer] account, sign up at: [email or web address]  You can access your medical information, make appointments, see lab results, billing information, and more.  If you have questions regarding this referral, please contact  the [facility] Referrals department at:             [phone]. Monday - Friday 8:00AM - 5:00PM.     Sincerely,    [facility]

## 2025-07-09 DIAGNOSIS — F41.9 ANXIETY: ICD-10-CM

## 2025-07-09 DIAGNOSIS — F32.A DEPRESSION, UNSPECIFIED DEPRESSION TYPE: ICD-10-CM

## (undated) DEVICE — CANISTER SUCTION 3000ML MECHANICAL FILTER AUTO SHUTOFF MEDI-VAC NONSTERILE LF DISP (40EA/CA)

## (undated) DEVICE — SUTURE GENERAL

## (undated) DEVICE — BLADE SURGICAL CLIPPER - (50EA/CA)

## (undated) DEVICE — HEADREST PRONEVIEW LARGE - (10/CA)

## (undated) DEVICE — SPONGE GAUZESTER 4 X 4 4PLY - (128PK/CA)

## (undated) DEVICE — COVER MAYO STAND X-LG - (22EA/CA)

## (undated) DEVICE — SUTURE 3-0 VICRYL PLUS RB-1 - 8 X 18 INCH (12/BX)

## (undated) DEVICE — TOOL MR8 14CM MATCH HD SYM-TRI 3MM DIAMETER (1/EA)

## (undated) DEVICE — MIDAS LUBRICATOR DIFFUSER PACK (4EA/CA)

## (undated) DEVICE — SLEEVE, VASO, THIGH, MED

## (undated) DEVICE — LACTATED RINGERS INJ. 500 ML - (24EA/CA)

## (undated) DEVICE — DRAPE STRLE REG TOWEL 18X24 - (10/BX 4BX/CA)"

## (undated) DEVICE — GOWN WARMING STANDARD FLEX - (30/CA)

## (undated) DEVICE — ELECTRODE DUAL RETURN W/ CORD - (50/PK)

## (undated) DEVICE — ARMREST CRADLE FOAM - (2PR/PK 12PR/CA)

## (undated) DEVICE — KIT EVACUATER 3 SPRING PVC LF 1/8 DRAIN SIZE (10EA/CA)"

## (undated) DEVICE — CLEANER ELECTRO-SURGICAL TIP - (25/BX 4BX/CA)

## (undated) DEVICE — KIT SURGIFLO W/OUT THROMBIN - (6EA/BX)

## (undated) DEVICE — SYRINGE NON SAFETY 10 CC 21 GA X 1-1/2 IN (100/BX 4BX/CA)

## (undated) DEVICE — SODIUM CHL IRRIGATION 0.9% 1000ML (12EA/CA)

## (undated) DEVICE — CLOSURE WOUND 1/4 X 4 (STERI - STRIP) (50/BX 4BX/CA)

## (undated) DEVICE — TUBING C&T SET FLYING LEADS DRAIN TUBING (10EA/BX)

## (undated) DEVICE — BOVIE BLADE SHORT - (150EA/CT)

## (undated) DEVICE — SENSOR OXIMETER ADULT SPO2 RD SET (20EA/BX)

## (undated) DEVICE — DRAPE 36X28IN RAD CARM BND BG - (25/CA) O

## (undated) DEVICE — DRAPE LAPAROTOMY T SHEET - (12EA/CA)

## (undated) DEVICE — SUCTION INSTRUMENT YANKAUER BULBOUS TIP W/O VENT (50EA/CA)

## (undated) DEVICE — COVER LIGHT HANDLE ALC PLUS DISP (18EA/BX)

## (undated) DEVICE — SUTURE 2-0 VICRYL PLUS CT-1 - 8 X 18 INCH(12/BX)

## (undated) DEVICE — LACTATED RINGERS INJ 1000 ML - (14EA/CA 60CA/PF)

## (undated) DEVICE — SUTURE 0 VICRYL PLUS CT-1 - 8 X 18 INCH (12/BX)

## (undated) DEVICE — CHLORAPREP 26 ML APPLICATOR - ORANGE TINT(25/CA)

## (undated) DEVICE — SET EXTENSION WITH 2 PORTS (48EA/CA) ***PART #2C8610 IS A SUBSTITUTE*****

## (undated) DEVICE — PACK NEURO - (2EA/CA)

## (undated) DEVICE — TUBING CLEARLINK DUO-VENT - C-FLO (48EA/CA)

## (undated) DEVICE — GLOVE BIOGEL PI ORTHO SZ 7.5 PF LF (40PR/BX)

## (undated) DEVICE — ADHESIVE MASTISOL - (48/BX)

## (undated) DEVICE — SET LEADWIRE 5 LEAD BEDSIDE DISPOSABLE ECG (1SET OF 5/EA)